# Patient Record
Sex: MALE | Race: BLACK OR AFRICAN AMERICAN | Employment: UNEMPLOYED | URBAN - METROPOLITAN AREA
[De-identification: names, ages, dates, MRNs, and addresses within clinical notes are randomized per-mention and may not be internally consistent; named-entity substitution may affect disease eponyms.]

---

## 2017-01-03 DIAGNOSIS — G40.209 PARTIAL SYMPTOMATIC EPILEPSY WITH COMPLEX PARTIAL SEIZURES, NOT INTRACTABLE, WITHOUT STATUS EPILEPTICUS (HCC): Primary | ICD-10-CM

## 2017-01-04 NOTE — TELEPHONE ENCOUNTER
Attempted to reach patient. No answer. Tried to reach patient's mother but not available at this time. Will try again later.      Noted patient has Keppra 750 mg and 500 mg and per Dr Patrice Olvera note on 6/15/16 patient to take keppra 1000 mg in am and 1500 mg

## 2017-01-04 NOTE — TELEPHONE ENCOUNTER
Medication: Keppra 500 mg    Date of last refill: 11/30/16  [place]    Last office visit: 6/15/16  Due back to clinic per last office note: 6 months   Date next office visit scheduled: 1/18/17    Last OV note recommen

## 2017-01-06 RX ORDER — LEVETIRACETAM 500 MG/1
TABLET ORAL
Qty: 90 TABLET | Refills: 0 | Status: SHIPPED | OUTPATIENT
Start: 2017-01-06 | End: 2017-02-16

## 2017-01-06 NOTE — TELEPHONE ENCOUNTER
See refill request from 01/03/17. Please refuse this request once request from 01/03/17 is approved.

## 2017-01-06 NOTE — TELEPHONE ENCOUNTER
Patient called back.  He states he is on Keppra 500 mg am and 1000 mg pm.     Medication pending Dr Karen Sanchez approval.

## 2017-01-09 ENCOUNTER — HOSPITAL ENCOUNTER (OUTPATIENT)
Age: 27
Discharge: HOME | End: 2017-01-09
Attending: FAMILY MEDICINE
Payer: COMMERCIAL

## 2017-01-09 VITALS
HEIGHT: 71 IN | TEMPERATURE: 99 F | DIASTOLIC BLOOD PRESSURE: 77 MMHG | WEIGHT: 180 LBS | OXYGEN SATURATION: 100 % | SYSTOLIC BLOOD PRESSURE: 130 MMHG | BODY MASS INDEX: 25.2 KG/M2 | RESPIRATION RATE: 18 BRPM | HEART RATE: 60 BPM

## 2017-01-09 DIAGNOSIS — L72.3 INFECTED SEBACEOUS CYST: Primary | ICD-10-CM

## 2017-01-09 DIAGNOSIS — K13.0 CHAPPED LIPS: ICD-10-CM

## 2017-01-09 DIAGNOSIS — L08.9 INFECTED SEBACEOUS CYST: Primary | ICD-10-CM

## 2017-01-09 PROCEDURE — 99213 OFFICE O/P EST LOW 20 MIN: CPT

## 2017-01-09 PROCEDURE — 99204 OFFICE O/P NEW MOD 45 MIN: CPT

## 2017-01-09 RX ORDER — LEVETIRACETAM 500 MG/1
500 TABLET ORAL 2 TIMES DAILY
COMMUNITY
End: 2017-01-12

## 2017-01-09 RX ORDER — SULFAMETHOXAZOLE AND TRIMETHOPRIM 800; 160 MG/1; MG/1
1 TABLET ORAL 2 TIMES DAILY
Qty: 20 TABLET | Refills: 0 | Status: SHIPPED | OUTPATIENT
Start: 2017-01-09 | End: 2017-02-24

## 2017-01-09 NOTE — ED PROVIDER NOTES
Patient Seen in: [facility] In [facility]    History   Patient presents with:  Lump Mass (integumentary)    Stated Complaint: LUMP ON BACK, PAINFUL/SWOLLEN LIPS    HPI    This 64-year-old male presents to the office with complaint of lump oscar 98.6 °F (37 °C)   Temp src 01/09/17 1053 Temporal   SpO2 01/09/17 1053 100 %   O2 Device 01/09/17 1053 None (Room air)       Current:/77 mmHg  Pulse 60  Temp(Src) 98.6 °F (37 °C) (Temporal)  Resp 18  Ht 180.3 cm (5' 11\")  Wt 81.647 kg  BMI 25.12 kg/ diagnosis)  Chapped lips    Disposition:  Discharge    Follow-up:  Aashish De La Vega,   2009 [address]    Schedule an appointment as soon as possible for a visit in 3 days  If symptoms worsen      Medicati

## 2017-01-09 NOTE — ED INITIAL ASSESSMENT (HPI)
Patient presents to [facility]. Care with cc of lump left upper back x 9 days. States it had been larger before and now has come down in size approx 6cmx 5cm. 2 days ago noted blisters to right lower lip swelling with canker sores. States now right upper back begin

## 2017-01-10 RX ORDER — LEVETIRACETAM 500 MG/1
TABLET ORAL
Qty: 90 TABLET | Refills: 0 | OUTPATIENT
Start: 2017-01-10

## 2017-01-10 NOTE — TELEPHONE ENCOUNTER
Rx for John signed by Dr. Kenneth Rivera per [facility] 1/6/17. Refused duplicate request with note to pharmacy at this time.

## 2017-01-12 ENCOUNTER — HOSPITAL ENCOUNTER (OUTPATIENT)
Age: 27
Discharge: HOME | End: 2017-01-12
Attending: FAMILY MEDICINE
Payer: COMMERCIAL

## 2017-01-12 VITALS
DIASTOLIC BLOOD PRESSURE: 75 MMHG | RESPIRATION RATE: 16 BRPM | HEART RATE: 73 BPM | OXYGEN SATURATION: 100 % | SYSTOLIC BLOOD PRESSURE: 129 MMHG | TEMPERATURE: 98 F

## 2017-01-12 DIAGNOSIS — L02.212 ABSCESS OF BACK: ICD-10-CM

## 2017-01-12 DIAGNOSIS — B00.1 HERPES LABIALIS: Primary | ICD-10-CM

## 2017-01-12 PROCEDURE — 99213 OFFICE O/P EST LOW 20 MIN: CPT

## 2017-01-12 PROCEDURE — 99214 OFFICE O/P EST MOD 30 MIN: CPT

## 2017-01-12 NOTE — ED PROVIDER NOTES
Patient Seen in: [facility] In [facility] END    History   Patient presents with:  Mouth/Lip Problem    Stated Complaint: recheck from 1/9/17; needs release for work due to reaction    HPI    Patient is a 70-year-old male.   Coming in today for fo discharge, ear pain, facial swelling, hearing loss, mouth sores, nosebleeds, postnasal drip, rhinorrhea, sinus pressure, sneezing, sore throat, tinnitus, trouble swallowing and voice change. Lip lesions   Eyes: Negative. Respiratory: Negative. heard.  Pulmonary/Chest: Effort normal and breath sounds normal. No respiratory distress. He has no wheezes. He has no rales. Lymphadenopathy:     He has no cervical adenopathy. Neurological: He is alert and oriented to person, place, and time.    Skin:

## 2017-01-12 NOTE — ED INITIAL ASSESSMENT (HPI)
Here for follow-up of the lip, yesterday started swolling and blistered, burning sensation and a little itchy. Started on Bactrim Ds on 1/10/2016 for infected sebaceous cyst.  Need a note if ok to go back to work.  (is a )

## 2017-02-16 RX ORDER — LEVETIRACETAM 500 MG/1
TABLET ORAL
Qty: 45 TABLET | Refills: 0 | Status: SHIPPED | OUTPATIENT
Start: 2017-02-16 | End: 2018-09-05

## 2017-02-16 NOTE — TELEPHONE ENCOUNTER
Left message for patient to give us a call back. Please help patient make an appointment. Then we can refill medication to the appointment date.       Medication: Levetiracetam 500mg    Date of last refill: 1/6/17  [place]

## 2017-02-24 ENCOUNTER — HOSPITAL ENCOUNTER (OUTPATIENT)
Age: 27
Discharge: HOME | End: 2017-02-24
Payer: COMMERCIAL

## 2017-02-24 VITALS
OXYGEN SATURATION: 96 % | DIASTOLIC BLOOD PRESSURE: 78 MMHG | HEART RATE: 83 BPM | TEMPERATURE: 98 F | SYSTOLIC BLOOD PRESSURE: 117 MMHG | RESPIRATION RATE: 20 BRPM

## 2017-02-24 DIAGNOSIS — J11.1 INFLUENZA: Primary | ICD-10-CM

## 2017-02-24 LAB — POCT RAPID STREP: NEGATIVE

## 2017-02-24 PROCEDURE — 99214 OFFICE O/P EST MOD 30 MIN: CPT

## 2017-02-24 PROCEDURE — 87147 CULTURE TYPE IMMUNOLOGIC: CPT | Performed by: FAMILY MEDICINE

## 2017-02-24 PROCEDURE — 87430 STREP A AG IA: CPT | Performed by: FAMILY MEDICINE

## 2017-02-24 PROCEDURE — 87081 CULTURE SCREEN ONLY: CPT | Performed by: FAMILY MEDICINE

## 2017-02-24 RX ORDER — IBUPROFEN 600 MG/1
600 TABLET ORAL ONCE
Status: COMPLETED | OUTPATIENT
Start: 2017-02-24 | End: 2017-02-24

## 2017-02-24 RX ORDER — ONDANSETRON 4 MG/1
4 TABLET, ORALLY DISINTEGRATING ORAL EVERY 4 HOURS PRN
Qty: 10 TABLET | Refills: 0 | Status: SHIPPED | OUTPATIENT
Start: 2017-02-24 | End: 2017-03-03

## 2017-02-24 NOTE — ED PROVIDER NOTES
Patient Seen in: [facility] In [facility] [address]    History   Patient presents with:  Fever    Stated Complaint: fever x 3 days / sorethroat / cough Jose SANCHEZ    Jacque Nielson is a 64-year-old male who presents for evaluation of sore throat, body aches noted above. PSFH elements reviewed from today and agreed except as otherwise stated in HPI.     Physical Exam     ED Triage Vitals   BP 02/24/17 1045 130/74 mmHg   Pulse 02/24/17 1045 76   Resp 02/24/17 1045 20   Temp 02/24/17 1045 100 °F (37.8 °C)   Te patient is encouraged to return if any concerning symptoms arise. Additional verbal discharge instructions are given to the patient and discussed. Discharge medications are discussed.  The patient is in good condition throughout his visit today and remains

## 2017-02-26 NOTE — ED NOTES
Pt called and given positive strep results, and need to  antibiotic of amoxil 875 mg po twice daily for 10 days, he will change out sheets and toothbrush after 24 hour  Called to [facility],  QS , may substitute no refills

## 2017-11-30 ENCOUNTER — HOSPITAL (OUTPATIENT)
Dept: OTHER | Age: 27
End: 2017-11-30
Attending: INTERNAL MEDICINE

## 2017-11-30 LAB
ALBUMIN SERPL-MCNC: 5 GM/DL (ref 3.6–5.1)
ALP SERPL-CCNC: 71 UNIT/L (ref 45–117)
ALT SERPL-CCNC: 55 UNIT/L
AMPHETAMINES UR QL SCN>500 NG/ML: NEGATIVE
ANALYZER ANC (IANC): ABNORMAL
ANION GAP SERPL CALC-SCNC: 14 MMOL/L (ref 10–20)
ANION GAP SERPL CALC-SCNC: 43 MMOL/L (ref 10–20)
APAP SERPL-MCNC: <2 MCG/ML (ref 10–30)
APTT PPP: 35 SECONDS (ref 22–30)
APTT PPP: ABNORMAL S
AST SERPL-CCNC: 36 UNIT/L
BARBITURATES UR QL SCN>200 NG/ML: NEGATIVE
BASE DEFICIT BLDA-SCNC: 18 MMOL/L (ref 0–2)
BASE DEFICIT BLDA-SCNC: 4 MMOL/L (ref 0–2)
BASE DEFICIT BLDV-SCNC: 29 MMOL/L (ref 0–2)
BASE EXCESS BLDA CALC-SCNC: ABNORMAL MMOL/L
BASE EXCESS BLDA CALC-SCNC: ABNORMAL MMOL/L
BASE EXCESS BLDV CALC-SCNC: ABNORMAL MMOL/L
BENZODIAZ UR QL SCN>200 NG/ML: POSITIVE
BILIRUB CONJ SERPL-MCNC: 0.1 MG/DL (ref 0–0.2)
BILIRUB SERPL-MCNC: 0.5 MG/DL (ref 0.2–1)
BODY TEMPERATURE: 37 DEGREES
BODY TEMPERATURE: 37 DEGREES
BUN SERPL-MCNC: 19 MG/DL (ref 6–20)
BUN SERPL-MCNC: 24 MG/DL (ref 6–20)
BUN/CREAT SERPL: 12 (ref 7–25)
BUN/CREAT SERPL: 8 (ref 7–25)
BZE UR QL SCN>150 NG/ML: NEGATIVE
CA-I BLD ISE-SCNC: 1.22 MMOL/L (ref 1.15–1.29)
CALCIUM SERPL-MCNC: 11 MG/DL (ref 8.4–10.2)
CALCIUM SERPL-MCNC: 8.3 MG/DL (ref 8.4–10.2)
CANNABINOIDS UR QL SCN>50 NG/ML: POSITIVE
CHLORIDE: 102 MMOL/L (ref 98–107)
CHLORIDE: 109 MMOL/L (ref 98–107)
CK SERPL-CCNC: 560 UNIT/L (ref 39–308)
CK SERPL-CCNC: 822 UNIT/L (ref 39–308)
CO2 SERPL-SCNC: 23 MMOL/L (ref 21–32)
CO2 SERPL-SCNC: 9 MMOL/L (ref 21–32)
CREAT SERPL-MCNC: 1.63 MG/DL (ref 0.67–1.17)
CREAT SERPL-MCNC: 2.88 MG/DL (ref 0.67–1.17)
CREATININE, POC: 1.5 MG/DL (ref 0.67–1.17)
ERYTHROCYTE [DISTWIDTH] IN BLOOD: 14.5 % (ref 11–15)
ETHANOL SERPL-MCNC: NORMAL MG/DL
GAS FLOW.O2 O2 DELIVERY SYS: ABNORMAL L/MIN
GAS FLOW.O2 O2 DELIVERY SYS: ABNORMAL L/MIN
GGT SERPL-CCNC: 18 UNIT/L (ref 15–85)
GLUCOSE BLDC GLUCOMTR-MCNC: 124 MG/DL (ref 65–99)
GLUCOSE BLDC GLUCOMTR-MCNC: 243 MG/DL (ref 65–99)
GLUCOSE BLDC GLUCOMTR-MCNC: 69 MG/DL (ref 65–99)
GLUCOSE BLDC GLUCOMTR-MCNC: 91 MG/DL (ref 65–99)
GLUCOSE SERPL-MCNC: 194 MG/DL (ref 65–99)
GLUCOSE SERPL-MCNC: 92 MG/DL (ref 65–99)
HCO3 BLDA-SCNC: 15 MMOL/L (ref 22–28)
HCO3 BLDA-SCNC: 21 MMOL/L (ref 22–28)
HCO3 BLDV-SCNC: 7 MMOL/L (ref 22–28)
HCT VFR BLD CALC: 54 % (ref 39–51)
HEMATOCRIT: 52.1 % (ref 39–51)
HGB BLD-MCNC: 16.2 GM/DL (ref 13–17)
INHALED O2 CONCENTRATION: ABNORMAL %
INR PPP: 1.1
LACTATE BLDV-SCNC: 1.2 MMOL/L (ref 0–2)
LACTATE BLDV-SCNC: 2.4 MMOL/L (ref 0–2)
LACTATE BLDV-SCNC: 3.9 MMOL/L (ref 0–2)
LACTATE BLDV-SCNC: 9.4 MMOL/L (ref 0–2)
LEVETIRACETAM SERPL-MCNC: <2 MCG/ML (ref 6–46)
MAGNESIUM SERPL-MCNC: 3.1 MG/DL (ref 1.7–2.4)
MAGNESIUM SERPL-MCNC: 3.5 MG/DL (ref 1.7–2.4)
MCH RBC QN AUTO: 29.9 PG (ref 26–34)
MCHC RBC AUTO-ENTMCNC: 31.1 GM/DL (ref 32–36.5)
MCV RBC AUTO: 96.3 FL (ref 78–100)
METHADONE UR QL SCN>300 NG/ML: NEGATIVE NG/ML
OPIATES UR QL SCN>300 NG/ML: NEGATIVE
OSMOLALITY SERPL: 304 MOSM/KG (ref 275–300)
OXYHGB MFR BLDA: 96 % (ref 94–98)
OXYHGB MFR BLDA: 97 % (ref 94–98)
PCO2 BLDA: 43 MM HG (ref 35–48)
PCO2 BLDA: 61 MM HG (ref 35–48)
PCO2 BLDV: 59 MM HG (ref 41–54)
PCP UR QL SCN>25 NG/ML: NEGATIVE
PH BLDA: 7.01 UNIT (ref 7.35–7.45)
PH BLDA: 7.32 UNIT (ref 7.35–7.45)
PH BLDV: 6.69 UNIT (ref 7.35–7.45)
PHOSPHATE SERPL-MCNC: 3.8 MG/DL (ref 2.4–4.7)
PLATELET # BLD: 270 THOUSAND/MCL (ref 140–450)
PO2 BLDA: 224 MM HG (ref 83–108)
PO2 BLDA: 251 MM HG (ref 83–108)
PO2 BLDV: 78 MM HG (ref 35–42)
POTASSIUM BLD-SCNC: 3.7 MMOL/L (ref 3.4–5.1)
POTASSIUM SERPL-SCNC: 3.4 MMOL/L (ref 3.4–5.1)
POTASSIUM SERPL-SCNC: 3.7 MMOL/L (ref 3.4–5.1)
POTASSIUM SERPL-SCNC: 3.8 MMOL/L (ref 3.4–5.1)
PROT SERPL-MCNC: 8.8 GM/DL (ref 6.4–8.2)
PROTHROMBIN TIME: 11 SECONDS (ref 9.7–11.8)
PROTHROMBIN TIME: NORMAL
RBC # BLD: 5.41 MILLION/MCL (ref 4.5–5.9)
SALICYLATES SERPL-MCNC: 4 MG/DL
SAO2 % BLDA: 98 % (ref 95–99)
SAO2 % BLDA: 99 % (ref 95–99)
SAO2 % BLDV: 71 % (ref 60–80)
SODIUM BLD-SCNC: 145 MMOL/L (ref 135–145)
SODIUM SERPL-SCNC: 143 MMOL/L (ref 135–145)
SODIUM SERPL-SCNC: 150 MMOL/L (ref 135–145)
TROPONIN I SERPL HS-MCNC: <0.02 NG/ML
WBC # BLD: 9.8 THOUSAND/MCL (ref 4.2–11)

## 2017-12-01 LAB
AMORPH SED URNS QL MICRO: ABNORMAL
ANALYZER ANC (IANC): ABNORMAL
ANION GAP SERPL CALC-SCNC: 12 MMOL/L (ref 10–20)
ANION GAP SERPL CALC-SCNC: 15 MMOL/L (ref 10–20)
ANION GAP SERPL CALC-SCNC: 17 MMOL/L (ref 10–20)
ANNOTATION COMMENT IMP: NORMAL
APPEARANCE UR: ABNORMAL
APTT PPP: 30 SECONDS (ref 22–30)
APTT PPP: NORMAL S
BACTERIA #/AREA URNS HPF: ABNORMAL /HPF
BASOPHILS # BLD: 0 THOUSAND/MCL (ref 0–0.3)
BASOPHILS NFR BLD: 0 %
BILIRUB UR QL: NEGATIVE
BUN SERPL-MCNC: 19 MG/DL (ref 6–20)
BUN SERPL-MCNC: 28 MG/DL (ref 6–20)
BUN SERPL-MCNC: 30 MG/DL (ref 6–20)
BUN/CREAT SERPL: 5 (ref 7–25)
BUN/CREAT SERPL: 6 (ref 7–25)
BUN/CREAT SERPL: 7 (ref 7–25)
CALCIUM SERPL-MCNC: 7.8 MG/DL (ref 8.4–10.2)
CALCIUM SERPL-MCNC: 8.2 MG/DL (ref 8.4–10.2)
CALCIUM SERPL-MCNC: 8.4 MG/DL (ref 8.4–10.2)
CAOX CRY URNS QL MICRO: PRESENT
CHLORIDE: 107 MMOL/L (ref 98–107)
CHLORIDE: 109 MMOL/L (ref 98–107)
CHLORIDE: 111 MMOL/L (ref 98–107)
CK SERPL-CCNC: 8716 UNIT/L (ref 39–308)
CK SERPL-CCNC: ABNORMAL UNIT/L (ref 39–308)
CO2 SERPL-SCNC: 20 MMOL/L (ref 21–32)
CO2 SERPL-SCNC: 21 MMOL/L (ref 21–32)
CO2 SERPL-SCNC: 24 MMOL/L (ref 21–32)
COLOR UR: YELLOW
CREAT SERPL-MCNC: 3.94 MG/DL (ref 0.67–1.17)
CREAT SERPL-MCNC: 4.19 MG/DL (ref 0.67–1.17)
CREAT SERPL-MCNC: 4.72 MG/DL (ref 0.67–1.17)
DIFFERENTIAL METHOD BLD: ABNORMAL
EOSINOPHIL # BLD: 0 THOUSAND/MCL (ref 0.1–0.5)
EOSINOPHIL NFR BLD: 0 %
EPITH CASTS #/AREA URNS LPF: ABNORMAL /[LPF]
ERYTHROCYTE [DISTWIDTH] IN BLOOD: 14.5 % (ref 11–15)
FATTY CASTS #/AREA URNS LPF: ABNORMAL /[LPF]
GLUCOSE BLDC GLUCOMTR-MCNC: 99 MG/DL (ref 65–99)
GLUCOSE SERPL-MCNC: 78 MG/DL (ref 65–99)
GLUCOSE SERPL-MCNC: 82 MG/DL (ref 65–99)
GLUCOSE SERPL-MCNC: 98 MG/DL (ref 65–99)
GLUCOSE UR-MCNC: ABNORMAL MG/DL
GRAN CASTS #/AREA URNS LPF: ABNORMAL /[LPF]
HBV CORE IGG+IGM SER QL: NEGATIVE
HBV SURFACE AB SER QL: NEGATIVE
HBV SURFACE AG SER QL: NEGATIVE
HEMATOCRIT: 38.6 % (ref 39–51)
HGB BLD-MCNC: 12.9 GM/DL (ref 13–17)
HGB UR QL: ABNORMAL
HYALINE CASTS #/AREA URNS LPF: ABNORMAL /LPF (ref 0–5)
INR PPP: 1.1
KETONES UR-MCNC: NEGATIVE MG/DL
LEUKOCYTE ESTERASE UR QL STRIP: ABNORMAL
LYMPHOCYTES # BLD: 1 THOUSAND/MCL (ref 1–4.8)
LYMPHOCYTES NFR BLD: 8 %
MCH RBC QN AUTO: 29.5 PG (ref 26–34)
MCHC RBC AUTO-ENTMCNC: 33.4 GM/DL (ref 32–36.5)
MCV RBC AUTO: 88.1 FL (ref 78–100)
MIXED CELL CASTS #/AREA URNS LPF: ABNORMAL /[LPF]
MONOCYTES # BLD: 1.2 THOUSAND/MCL (ref 0.3–0.9)
MONOCYTES NFR BLD: 9 %
MUCOUS THREADS URNS QL MICRO: PRESENT
NEUTROPHILS # BLD: 10.7 THOUSAND/MCL (ref 1.8–7.7)
NEUTROPHILS NFR BLD: 83 %
NEUTS SEG NFR BLD: ABNORMAL %
NITRITE UR QL: NEGATIVE
ORGANIC ACIDS/CREAT UR-SRTO: 207.78 MG/DL
PERCENT NRBC: ABNORMAL
PH UR: 5 UNIT (ref 5–7)
PLATELET # BLD: 183 THOUSAND/MCL (ref 140–450)
POTASSIUM SERPL-SCNC: 3.3 MMOL/L (ref 3.4–5.1)
POTASSIUM SERPL-SCNC: 4.1 MMOL/L (ref 3.4–5.1)
POTASSIUM SERPL-SCNC: 4.4 MMOL/L (ref 3.4–5.1)
PROT UR QL: 100 MG/DL
PROTHROMBIN TIME: 10.8 SECONDS (ref 9.7–11.8)
PROTHROMBIN TIME: NORMAL
RBC # BLD: 4.38 MILLION/MCL (ref 4.5–5.9)
RBC #/AREA URNS HPF: ABNORMAL /HPF (ref 0–3)
RBC CASTS #/AREA URNS LPF: ABNORMAL /[LPF]
RENAL EPI CELLS #/AREA URNS HPF: ABNORMAL /[HPF]
SODIUM SERPL-SCNC: 140 MMOL/L (ref 135–145)
SODIUM SERPL-SCNC: 142 MMOL/L (ref 135–145)
SODIUM SERPL-SCNC: 143 MMOL/L (ref 135–145)
SODIUM UR-SCNC: 72 MMOL/L
SP GR UR: 1.01 (ref 1–1.03)
SPECIMEN SOURCE: ABNORMAL
SPERM URNS QL MICRO: ABNORMAL
SQUAMOUS #/AREA URNS HPF: ABNORMAL /HPF (ref 0–5)
T VAGINALIS URNS QL MICRO: ABNORMAL
TRI-PHOS CRY URNS QL MICRO: ABNORMAL
URATE CRY URNS QL MICRO: ABNORMAL
URINE REFLEX: ABNORMAL
URNS CMNT MICRO: ABNORMAL
UROBILINOGEN UR QL: 0.2 MG/DL (ref 0–1)
WAXY CASTS #/AREA URNS LPF: ABNORMAL /[LPF]
WBC # BLD: 12.9 THOUSAND/MCL (ref 4.2–11)
WBC #/AREA URNS HPF: ABNORMAL /HPF (ref 0–5)
WBC CASTS #/AREA URNS LPF: ABNORMAL /[LPF]
YEAST HYPHAE URNS QL MICRO: ABNORMAL
YEAST URNS QL MICRO: ABNORMAL

## 2017-12-02 LAB
ANALYZER ANC (IANC): ABNORMAL
ANION GAP SERPL CALC-SCNC: 11 MMOL/L (ref 10–20)
BASOPHILS # BLD: 0 THOUSAND/MCL (ref 0–0.3)
BASOPHILS NFR BLD: 0 %
BUN SERPL-MCNC: 21 MG/DL (ref 6–20)
BUN/CREAT SERPL: 4 (ref 7–25)
CALCIUM SERPL-MCNC: 8.1 MG/DL (ref 8.4–10.2)
CHLORIDE: 106 MMOL/L (ref 98–107)
CHOLEST SERPL-MCNC: 117 MG/DL
CHOLEST/HDLC SERPL: 3.5 {RATIO}
CK SERPL-CCNC: ABNORMAL UNIT/L (ref 39–308)
CO2 SERPL-SCNC: 23 MMOL/L (ref 21–32)
CREAT SERPL-MCNC: 5.06 MG/DL (ref 0.67–1.17)
DIFFERENTIAL METHOD BLD: ABNORMAL
EOSINOPHIL # BLD: 0 THOUSAND/MCL (ref 0.1–0.5)
EOSINOPHIL NFR BLD: 0 %
ERYTHROCYTE [DISTWIDTH] IN BLOOD: 14 % (ref 11–15)
GLUCOSE SERPL-MCNC: 98 MG/DL (ref 65–99)
HDLC SERPL-MCNC: 33 MG/DL
HEMATOCRIT: 36.4 % (ref 39–51)
HGB BLD-MCNC: 12.3 GM/DL (ref 13–17)
LDLC SERPL CALC-MCNC: 64 MG/DL
LIPASE SERPL-CCNC: 83 UNIT/L (ref 73–393)
LYMPHOCYTES # BLD: 1.2 THOUSAND/MCL (ref 1–4.8)
LYMPHOCYTES NFR BLD: 13 %
MAGNESIUM SERPL-MCNC: 1.8 MG/DL (ref 1.7–2.4)
MCH RBC QN AUTO: 29 PG (ref 26–34)
MCHC RBC AUTO-ENTMCNC: 33.8 GM/DL (ref 32–36.5)
MCV RBC AUTO: 85.8 FL (ref 78–100)
MONOCYTES # BLD: 1.1 THOUSAND/MCL (ref 0.3–0.9)
MONOCYTES NFR BLD: 12 %
NEUTROPHILS # BLD: 7 THOUSAND/MCL (ref 1.8–7.7)
NEUTROPHILS NFR BLD: 75 %
NEUTS SEG NFR BLD: ABNORMAL %
NONHDLC SERPL-MCNC: 84 MG/DL
PERCENT NRBC: ABNORMAL
PHOSPHATE SERPL-MCNC: 2.7 MG/DL (ref 2.4–4.7)
PLATELET # BLD: 165 THOUSAND/MCL (ref 140–450)
POTASSIUM SERPL-SCNC: 3.5 MMOL/L (ref 3.4–5.1)
RBC # BLD: 4.24 MILLION/MCL (ref 4.5–5.9)
SODIUM SERPL-SCNC: 136 MMOL/L (ref 135–145)
TRIGLYCERIDE (TRIGP): 101 MG/DL
WBC # BLD: 9.4 THOUSAND/MCL (ref 4.2–11)

## 2017-12-03 LAB
ANALYZER ANC (IANC): ABNORMAL
ANION GAP SERPL CALC-SCNC: 15 MMOL/L (ref 10–20)
BASOPHILS # BLD: 0 THOUSAND/MCL (ref 0–0.3)
BASOPHILS NFR BLD: 0 %
BUN SERPL-MCNC: 27 MG/DL (ref 6–20)
BUN/CREAT SERPL: 3 (ref 7–25)
CALCIUM SERPL-MCNC: 8.5 MG/DL (ref 8.4–10.2)
CHLORIDE: 110 MMOL/L (ref 98–107)
CK SERPL-CCNC: ABNORMAL UNIT/L (ref 39–308)
CO2 SERPL-SCNC: 21 MMOL/L (ref 21–32)
CREAT SERPL-MCNC: 7.93 MG/DL (ref 0.67–1.17)
DIFFERENTIAL METHOD BLD: ABNORMAL
EOSINOPHIL # BLD: 0.1 THOUSAND/MCL (ref 0.1–0.5)
EOSINOPHIL NFR BLD: 1 %
ERYTHROCYTE [DISTWIDTH] IN BLOOD: 13.9 % (ref 11–15)
GLUCOSE SERPL-MCNC: 97 MG/DL (ref 65–99)
HEMATOCRIT: 35.8 % (ref 39–51)
HGB BLD-MCNC: 12.6 GM/DL (ref 13–17)
LYMPHOCYTES # BLD: 1.3 THOUSAND/MCL (ref 1–4.8)
LYMPHOCYTES NFR BLD: 15 %
MAGNESIUM SERPL-MCNC: 1.8 MG/DL (ref 1.7–2.4)
MCH RBC QN AUTO: 30.2 PG (ref 26–34)
MCHC RBC AUTO-ENTMCNC: 35.2 GM/DL (ref 32–36.5)
MCV RBC AUTO: 85.9 FL (ref 78–100)
MONOCYTES # BLD: 1.2 THOUSAND/MCL (ref 0.3–0.9)
MONOCYTES NFR BLD: 13 %
NEUTROPHILS # BLD: 6.5 THOUSAND/MCL (ref 1.8–7.7)
NEUTROPHILS NFR BLD: 71 %
NEUTS SEG NFR BLD: ABNORMAL %
PERCENT NRBC: ABNORMAL
PHOSPHATE SERPL-MCNC: 3.1 MG/DL (ref 2.4–4.7)
PLATELET # BLD: 168 THOUSAND/MCL (ref 140–450)
POTASSIUM SERPL-SCNC: 4.4 MMOL/L (ref 3.4–5.1)
RBC # BLD: 4.17 MILLION/MCL (ref 4.5–5.9)
SODIUM SERPL-SCNC: 142 MMOL/L (ref 135–145)
WBC # BLD: 9.1 THOUSAND/MCL (ref 4.2–11)

## 2017-12-04 LAB
ALBUMIN SERPL-MCNC: 3.2 GM/DL (ref 3.6–5.1)
ALBUMIN/GLOB SERPL: 1.1 {RATIO} (ref 1–2.4)
ALP SERPL-CCNC: 60 UNIT/L (ref 45–117)
ALT SERPL-CCNC: 104 UNIT/L
ANALYZER ANC (IANC): ABNORMAL
ANION GAP SERPL CALC-SCNC: 14 MMOL/L (ref 10–20)
AST SERPL-CCNC: 168 UNIT/L
BILIRUB SERPL-MCNC: 0.7 MG/DL (ref 0.2–1)
BUN SERPL-MCNC: 29 MG/DL (ref 6–20)
BUN/CREAT SERPL: 4 (ref 7–25)
CALCIUM SERPL-MCNC: 8.8 MG/DL (ref 8.4–10.2)
CHLORIDE: 109 MMOL/L (ref 98–107)
CO2 SERPL-SCNC: 26 MMOL/L (ref 21–32)
CREAT SERPL-MCNC: 7.81 MG/DL (ref 0.67–1.17)
ERYTHROCYTE [DISTWIDTH] IN BLOOD: 13.9 % (ref 11–15)
GLOBULIN SER-MCNC: 3 GM/DL (ref 2–4)
GLUCOSE SERPL-MCNC: 94 MG/DL (ref 65–99)
HEMATOCRIT: 35.4 % (ref 39–51)
HGB BLD-MCNC: 12.7 GM/DL (ref 13–17)
MCH RBC QN AUTO: 30.9 PG (ref 26–34)
MCHC RBC AUTO-ENTMCNC: 35.9 GM/DL (ref 32–36.5)
MCV RBC AUTO: 86.1 FL (ref 78–100)
PLATELET # BLD: 158 THOUSAND/MCL (ref 140–450)
POTASSIUM SERPL-SCNC: 4.3 MMOL/L (ref 3.4–5.1)
PREALB SERPL NEPH-MCNC: 20.3 MG/DL (ref 18–38)
PROT SERPL-MCNC: 6.2 GM/DL (ref 6.4–8.2)
RBC # BLD: 4.11 MILLION/MCL (ref 4.5–5.9)
SODIUM SERPL-SCNC: 145 MMOL/L (ref 135–145)
WBC # BLD: 6.8 THOUSAND/MCL (ref 4.2–11)

## 2017-12-05 LAB
ALBUMIN SERPL-MCNC: 3.5 GM/DL (ref 3.6–5.1)
ALBUMIN/GLOB SERPL: 1.1 {RATIO} (ref 1–2.4)
ALP SERPL-CCNC: 65 UNIT/L (ref 45–117)
ALT SERPL-CCNC: 109 UNIT/L
ANION GAP SERPL CALC-SCNC: 13 MMOL/L (ref 10–20)
AST SERPL-CCNC: 142 UNIT/L
BILIRUB SERPL-MCNC: 0.6 MG/DL (ref 0.2–1)
BUN SERPL-MCNC: 27 MG/DL (ref 6–20)
BUN/CREAT SERPL: 5 (ref 7–25)
CALCIUM SERPL-MCNC: 8.8 MG/DL (ref 8.4–10.2)
CHLORIDE: 109 MMOL/L (ref 98–107)
CO2 SERPL-SCNC: 29 MMOL/L (ref 21–32)
CREAT SERPL-MCNC: 5.49 MG/DL (ref 0.67–1.17)
GLOBULIN SER-MCNC: 3.1 GM/DL (ref 2–4)
GLUCOSE SERPL-MCNC: 85 MG/DL (ref 65–99)
POTASSIUM SERPL-SCNC: 4.5 MMOL/L (ref 3.4–5.1)
PROT SERPL-MCNC: 6.6 GM/DL (ref 6.4–8.2)
SODIUM SERPL-SCNC: 146 MMOL/L (ref 135–145)

## 2017-12-06 LAB
ALBUMIN SERPL-MCNC: 3.6 GM/DL (ref 3.6–5.1)
ALBUMIN/GLOB SERPL: 1 {RATIO} (ref 1–2.4)
ALP SERPL-CCNC: 66 UNIT/L (ref 45–117)
ALT SERPL-CCNC: 109 UNIT/L
ANION GAP SERPL CALC-SCNC: 12 MMOL/L (ref 10–20)
AST SERPL-CCNC: 115 UNIT/L
BILIRUB SERPL-MCNC: 0.3 MG/DL (ref 0.2–1)
BUN SERPL-MCNC: 26 MG/DL (ref 6–20)
BUN/CREAT SERPL: 7 (ref 7–25)
CALCIUM SERPL-MCNC: 8.6 MG/DL (ref 8.4–10.2)
CHLORIDE: 104 MMOL/L (ref 98–107)
CO2 SERPL-SCNC: 30 MMOL/L (ref 21–32)
CREAT SERPL-MCNC: 3.83 MG/DL (ref 0.67–1.17)
GLOBULIN SER-MCNC: 3.6 GM/DL (ref 2–4)
GLUCOSE SERPL-MCNC: 89 MG/DL (ref 65–99)
HEMATOCRIT: 39.3 % (ref 39–51)
HGB BLD-MCNC: 13.6 GM/DL (ref 13–17)
MAGNESIUM SERPL-MCNC: 1.3 MG/DL (ref 1.7–2.4)
PHOSPHATE SERPL-MCNC: 5.5 MG/DL (ref 2.4–4.7)
POTASSIUM SERPL-SCNC: 4.4 MMOL/L (ref 3.4–5.1)
PROT SERPL-MCNC: 7.2 GM/DL (ref 6.4–8.2)
SODIUM SERPL-SCNC: 142 MMOL/L (ref 135–145)

## 2018-09-05 ENCOUNTER — LABORATORY ENCOUNTER (OUTPATIENT)
Dept: LAB | Age: 28
End: 2018-09-05
Attending: Other
Payer: MEDICAID

## 2018-09-05 ENCOUNTER — OFFICE VISIT (OUTPATIENT)
Dept: NEUROLOGY | Facility: CLINIC | Age: 28
End: 2018-09-05
Payer: MEDICAID

## 2018-09-05 VITALS
WEIGHT: 186 LBS | SYSTOLIC BLOOD PRESSURE: 125 MMHG | RESPIRATION RATE: 16 BRPM | BODY MASS INDEX: 26.04 KG/M2 | HEIGHT: 71 IN | DIASTOLIC BLOOD PRESSURE: 73 MMHG | HEART RATE: 63 BPM

## 2018-09-05 DIAGNOSIS — G40.209 PARTIAL SYMPTOMATIC EPILEPSY WITH COMPLEX PARTIAL SEIZURES, NOT INTRACTABLE, WITHOUT STATUS EPILEPTICUS (HCC): ICD-10-CM

## 2018-09-05 DIAGNOSIS — G40.209 PARTIAL SYMPTOMATIC EPILEPSY WITH COMPLEX PARTIAL SEIZURES, NOT INTRACTABLE, WITHOUT STATUS EPILEPTICUS (HCC): Primary | ICD-10-CM

## 2018-09-05 LAB
ALBUMIN SERPL-MCNC: 4.4 G/DL (ref 3.5–4.8)
ALBUMIN/GLOB SERPL: 1.4 {RATIO} (ref 1–2)
ALP LIVER SERPL-CCNC: 84 U/L (ref 45–117)
ALT SERPL-CCNC: 29 U/L (ref 17–63)
ANION GAP SERPL CALC-SCNC: 7 MMOL/L (ref 0–18)
AST SERPL-CCNC: 16 U/L (ref 15–41)
BASOPHILS # BLD AUTO: 0.02 X10(3) UL (ref 0–0.1)
BASOPHILS NFR BLD AUTO: 0.4 %
BILIRUB SERPL-MCNC: 0.3 MG/DL (ref 0.1–2)
BUN BLD-MCNC: 15 MG/DL (ref 8–20)
BUN/CREAT SERPL: 11.7 (ref 10–20)
CALCIUM BLD-MCNC: 9.1 MG/DL (ref 8.3–10.3)
CHLORIDE SERPL-SCNC: 108 MMOL/L (ref 101–111)
CO2 SERPL-SCNC: 28 MMOL/L (ref 22–32)
CREAT BLD-MCNC: 1.28 MG/DL (ref 0.7–1.3)
EOSINOPHIL # BLD AUTO: 0.18 X10(3) UL (ref 0–0.3)
EOSINOPHIL NFR BLD AUTO: 3.9 %
ERYTHROCYTE [DISTWIDTH] IN BLOOD BY AUTOMATED COUNT: 14.6 % (ref 11.5–16)
GLOBULIN PLAS-MCNC: 3.2 G/DL (ref 2.5–4)
GLUCOSE BLD-MCNC: 114 MG/DL (ref 70–99)
HCT VFR BLD AUTO: 39.1 % (ref 37–53)
HGB BLD-MCNC: 13.3 G/DL (ref 13–17)
IMMATURE GRANULOCYTE COUNT: 0.01 X10(3) UL (ref 0–1)
IMMATURE GRANULOCYTE RATIO %: 0.2 %
LYMPHOCYTES # BLD AUTO: 1.27 X10(3) UL (ref 0.9–4)
LYMPHOCYTES NFR BLD AUTO: 27.4 %
M PROTEIN MFR SERPL ELPH: 7.6 G/DL (ref 6.1–8.3)
MCH RBC QN AUTO: 30 PG (ref 27–33.2)
MCHC RBC AUTO-ENTMCNC: 34 G/DL (ref 31–37)
MCV RBC AUTO: 88.3 FL (ref 80–99)
MONOCYTES # BLD AUTO: 0.44 X10(3) UL (ref 0.1–1)
MONOCYTES NFR BLD AUTO: 9.5 %
NEUTROPHIL ABS PRELIM: 2.71 X10 (3) UL (ref 1.3–6.7)
NEUTROPHILS # BLD AUTO: 2.71 X10(3) UL (ref 1.3–6.7)
NEUTROPHILS NFR BLD AUTO: 58.6 %
OSMOLALITY SERPL CALC.SUM OF ELEC: 298 MOSM/KG (ref 275–295)
PHENYTOIN (DILANTIN): 15.9 UG/ML (ref 10–20)
PLATELET # BLD AUTO: 233 10(3)UL (ref 150–450)
POTASSIUM SERPL-SCNC: 3.4 MMOL/L (ref 3.6–5.1)
RBC # BLD AUTO: 4.43 X10(6)UL (ref 4.3–5.7)
RED CELL DISTRIBUTION WIDTH-SD: 46.8 FL (ref 35.1–46.3)
SODIUM SERPL-SCNC: 143 MMOL/L (ref 136–144)
WBC # BLD AUTO: 4.6 X10(3) UL (ref 4–13)

## 2018-09-05 PROCEDURE — 80053 COMPREHEN METABOLIC PANEL: CPT

## 2018-09-05 PROCEDURE — 85025 COMPLETE CBC W/AUTO DIFF WBC: CPT

## 2018-09-05 PROCEDURE — 99214 OFFICE O/P EST MOD 30 MIN: CPT | Performed by: OTHER

## 2018-09-05 PROCEDURE — 80185 ASSAY OF PHENYTOIN TOTAL: CPT

## 2018-09-05 PROCEDURE — 1111F DSCHRG MED/CURRENT MED MERGE: CPT | Performed by: OTHER

## 2018-09-05 PROCEDURE — 36415 COLL VENOUS BLD VENIPUNCTURE: CPT

## 2018-09-05 RX ORDER — PHENYTOIN SODIUM 100 MG/1
200 CAPSULE, EXTENDED RELEASE ORAL DAILY
COMMUNITY
End: 2018-10-16

## 2018-09-05 RX ORDER — PHENYTOIN 50 MG/1
150 TABLET, CHEWABLE ORAL NIGHTLY
COMMUNITY
End: 2018-10-16

## 2018-09-05 NOTE — PATIENT INSTRUCTIONS
Please have levels checked   Have EEG done    Refill policies:    • Allow 2-3 business days for refills; controlled substances may take longer.   • Contact your pharmacy at least 5 days prior to running out of medication and have them send an electronic req authorization, patient may be responsible for the entire amount billed. Precertification and Prior Authorizations: If your physician has recommended that you have a procedure or additional testing performed.   [facility]) will gauze. Paste is water soluble so most of it will be cleaned out of your hair with a warm washcloth before you leave. If you like, you can bring a hat to wear after the test or put your hair in a ponytail.    · You will be asked to relax with your eyes arleth

## 2018-09-05 NOTE — PROGRESS NOTES
[facility] Progress Note    Patient presents with:  Hospital F/U: Patient had two seizures on Saturday morning      HPI  As per my initial H&P from 9/9/14:  Eufemia Godoy is a 20year old, who presents for evaluation of seizures.  Pa admits to two episodes in the past year where he was wet his bed; admits to some issues with memory.  \"  l   Patient last seen 10/2015:        Since last visit, he has been lost to follow up - [place] and and was not seeing a neurologist at t inguinal hernia  No date: SKIN GRAFT PROCEDURE  Family History   Problem Relation Age of Onset   • Seizure Disorder Cousin      Social History    Socioeconomic History      Marital status: Single      Spouse name: Not on file      Number of children: Not o Patient Position: Sitting, Cuff Size: large)   Pulse 63   Resp 16   Ht 71\"   Wt 186 lb   BMI 25.94 kg/m²   Estimated body mass index is 25.94 kg/m² as calculated from the following:    Height as of this encounter: 71\".     Weight as of this encounter: 186 levels checked - adjust medications pending results  - will check EEG and CBC, CMP as well and review prior notes when able     No seizures currently and will continue same dose while awaiting levels     (G40.209) Partial symptomatic epilepsy with complex

## 2018-09-19 ENCOUNTER — TELEPHONE (OUTPATIENT)
Dept: NEUROLOGY | Facility: CLINIC | Age: 28
End: 2018-09-19

## 2018-09-19 NOTE — TELEPHONE ENCOUNTER
Spoke with pt regarding pt needing an appt to follow up with test results.    Advised pt that test results are in from labs but he should make the appt a couple days after 10/5/18. (pt has EEG on 10/5)  Pt has no other questions or concerns and verbalized u

## 2018-10-05 ENCOUNTER — NURSE ONLY (OUTPATIENT)
Dept: NEUROLOGY | Facility: CLINIC | Age: 28
End: 2018-10-05
Payer: MEDICAID

## 2018-10-05 DIAGNOSIS — G40.209 PARTIAL SYMPTOMATIC EPILEPSY WITH COMPLEX PARTIAL SEIZURES, NOT INTRACTABLE, WITHOUT STATUS EPILEPTICUS (HCC): Primary | ICD-10-CM

## 2018-10-05 PROCEDURE — 95819 EEG AWAKE AND ASLEEP: CPT | Performed by: OTHER

## 2018-10-15 NOTE — PROCEDURES
[facility] in [place]  in affiliation with [facility]  [address]  [place], [address] [place] [address]  [phone]  Fax [phone]      Name: Rick Bustamante  10/14/1990  Date

## 2018-10-16 ENCOUNTER — OFFICE VISIT (OUTPATIENT)
Dept: NEUROLOGY | Facility: CLINIC | Age: 28
End: 2018-10-16
Payer: MEDICAID

## 2018-10-16 VITALS
SYSTOLIC BLOOD PRESSURE: 133 MMHG | WEIGHT: 186 LBS | HEART RATE: 71 BPM | RESPIRATION RATE: 16 BRPM | BODY MASS INDEX: 26.04 KG/M2 | HEIGHT: 71 IN | DIASTOLIC BLOOD PRESSURE: 77 MMHG

## 2018-10-16 DIAGNOSIS — G40.209 PARTIAL SYMPTOMATIC EPILEPSY WITH COMPLEX PARTIAL SEIZURES, NOT INTRACTABLE, WITHOUT STATUS EPILEPTICUS (HCC): Primary | ICD-10-CM

## 2018-10-16 DIAGNOSIS — G25.81 RESTLESS LEGS SYNDROME (RLS): ICD-10-CM

## 2018-10-16 PROCEDURE — 99214 OFFICE O/P EST MOD 30 MIN: CPT | Performed by: OTHER

## 2018-10-16 RX ORDER — PHENYTOIN 50 MG/1
150 TABLET, CHEWABLE ORAL NIGHTLY
Qty: 90 TABLET | Refills: 5 | Status: SHIPPED | OUTPATIENT
Start: 2018-10-16 | End: 2018-10-25

## 2018-10-16 RX ORDER — GABAPENTIN 100 MG/1
100 CAPSULE ORAL NIGHTLY
Qty: 30 CAPSULE | Refills: 2 | Status: SHIPPED | OUTPATIENT
Start: 2018-10-16 | End: 2018-10-25

## 2018-10-16 RX ORDER — PHENYTOIN SODIUM 100 MG/1
200 CAPSULE, EXTENDED RELEASE ORAL DAILY
Qty: 60 CAPSULE | Refills: 5 | Status: SHIPPED | OUTPATIENT
Start: 2018-10-16 | End: 2018-11-01 | Stop reason: CLARIF

## 2018-10-16 NOTE — PROGRESS NOTES
[facility] Progress Note    Patient presents with:  Seizures: Follow up      HPI  As per my initial H&P from 9/9/14:  \"Rocco Guerra is a 20year old, who presents for evaluation of seizures.  Patient states he had his first seizure a year where he was wet his bed; admits to some issues with memory. \"         Overall, since last visit, he has been doing better. Patient states a couple of weeks ago, he had one episode of dizziness and speaking gibberish.        About 2 weeks ago, he file    Other Topics      Concerns:         Service: Not Asked        Blood Transfusions: Not Asked        Caffeine Concern: Yes          soda occasionally        Occupational Exposure: Not Asked        Hobby Hazards: Not Asked        Sleep Concern intact, tongue and palate midline, SCM/hearing intact, otherwise, CN 2-12 intact   Motor: 5/5 strength throughout, tone normal  Sensory: intact to light touch throughout  Coord: FNF and HKS intact with no tremor or dysmetria  Romberg: absent  Gait: normal 6.1 - 8.3 g/dL 7.6   Albumin      3.5 - 4.8 g/dL 4.4   GLOBULIN, TOTAL      2.5 - 4.0 g/dL 3.2   A/G RATIO      1.0 - 2.0 1.4   PHENYTOIN (DILANTIN)      10.0 - 20.0 ug/mL 15.9       Imaging:  None    Other procedures:   New since last visit:     EEG: (10/ rule out epilepsy as a diagnosis, and we will continue the same medications, at this time. Will monitor for any new or worsening symptoms.     Additionally, patient was counseled regarding the need to take his seizure medications at the prescribed dosage

## 2018-10-16 NOTE — PATIENT INSTRUCTIONS
Refill policies:    • Allow 2-3 business days for refills; controlled substances may take longer.   • Contact your pharmacy at least 5 days prior to running out of medication and have them send an electronic request or submit request through the “request re entire amount billed. Precertification and Prior Authorizations: If your physician has recommended that you have a procedure or additional testing performed.   [facility]) will contact your insurance carrier to obtain pre-certi

## 2018-10-19 ENCOUNTER — TELEPHONE (OUTPATIENT)
Dept: NEUROLOGY | Facility: CLINIC | Age: 28
End: 2018-10-19

## 2018-10-19 DIAGNOSIS — G40.209 PARTIAL SYMPTOMATIC EPILEPSY WITH COMPLEX PARTIAL SEIZURES, NOT INTRACTABLE, WITHOUT STATUS EPILEPTICUS (HCC): Primary | ICD-10-CM

## 2018-10-19 NOTE — TELEPHONE ENCOUNTER
Called patient and kimberly - states he had seizure earlier this AM - woke up feeling nauseated and dizzy, then had generalized tonic clonic seizure ~ 2 minutes this AM ~0830 - was post-ictal, with urinary incontinence per kimberly, went to ER and currently

## 2018-10-19 NOTE — TELEPHONE ENCOUNTER
Patient with 2 new seizures since initial one; no neurologist at local hospital per ER and recommended admission - states he will be transferred to [facility] where neurology is - requested update from admission when done

## 2018-10-19 NOTE — TELEPHONE ENCOUNTER
Patient had a seizure this morning at about 9:00 am and is at the ER [facility] in [facility].  Patient was seeing on Tuesday and per Dr Valderrama Frijarvis if patient had a seizure to inform .Please call deep back at [phone]

## 2018-10-19 NOTE — TELEPHONE ENCOUNTER
Pt is still in the ER, about to be released, when he experienced another seizure. Had just taken additional medication recommended by Dr. Nayely Davidson. Mom requesting to speak with Dr. Nayely Davidson.

## 2018-10-22 NOTE — TELEPHONE ENCOUNTER
Dilantin level is within range  ~17 per chart review from this past weekend - however, still having seizures; will increase Vimpat to 150 mg bid    And have follow up as scheduled this week.

## 2018-10-22 NOTE — TELEPHONE ENCOUNTER
Patient contacted office this AM for condition update. Had another seizure over the weekend after discharge. Made appointment for Thursday.

## 2018-10-25 ENCOUNTER — OFFICE VISIT (OUTPATIENT)
Dept: NEUROLOGY | Facility: CLINIC | Age: 28
End: 2018-10-25
Payer: MEDICAID

## 2018-10-25 ENCOUNTER — APPOINTMENT (OUTPATIENT)
Dept: LAB | Age: 28
End: 2018-10-25
Attending: Other
Payer: MEDICAID

## 2018-10-25 VITALS — RESPIRATION RATE: 18 BRPM | SYSTOLIC BLOOD PRESSURE: 126 MMHG | DIASTOLIC BLOOD PRESSURE: 72 MMHG | HEART RATE: 68 BPM

## 2018-10-25 DIAGNOSIS — G40.209 PARTIAL SYMPTOMATIC EPILEPSY WITH COMPLEX PARTIAL SEIZURES, NOT INTRACTABLE, WITHOUT STATUS EPILEPTICUS (HCC): ICD-10-CM

## 2018-10-25 DIAGNOSIS — G25.81 RESTLESS LEGS SYNDROME (RLS): ICD-10-CM

## 2018-10-25 PROCEDURE — 99214 OFFICE O/P EST MOD 30 MIN: CPT | Performed by: OTHER

## 2018-10-25 PROCEDURE — 36415 COLL VENOUS BLD VENIPUNCTURE: CPT

## 2018-10-25 PROCEDURE — 80185 ASSAY OF PHENYTOIN TOTAL: CPT

## 2018-10-25 RX ORDER — GABAPENTIN 100 MG/1
200 CAPSULE ORAL NIGHTLY
Qty: 60 CAPSULE | Refills: 2 | Status: SHIPPED | OUTPATIENT
Start: 2018-10-25 | End: 2018-12-07 | Stop reason: ALTCHOICE

## 2018-10-25 NOTE — PATIENT INSTRUCTIONS
Set up appointment with epilepsy specialist at [facility] same dose of medications for now   Follow up after seen   Refill policies:    • Allow 2-3 business days for refills; controlled substances may take longer.   • Contact your pharmacy at least 5 d directly to determine coverage. If test is done without insurance authorization, patient may be responsible for the entire amount billed. Precertification and Prior Authorizations:   If your physician has recommended that you have a procedure or addit

## 2018-10-25 NOTE — PROGRESS NOTES
[facility] Progress Note    Patient presents with:  Seizures      HPI  As per my initial H&P from 9/9/14:  Sarah Dougherty is a 20year old, who presents for evaluation of seizures. Patient states he had his first seizure at age 23.  H was wet his bed; admits to some issues with memory. \"         Overall, since last visit, he has been doing better. Patient states a couple of weeks ago, he had one episode of dizziness and speaking gibberish.        About 2 weeks ago, he was also havi Past Medical History:   Diagnosis Date   • Epilepsy ([facility] [address]    • Headache     Due to seizures   • Seizure disorder [facility])      Past Surgical History:   Procedure Laterality Date   • HERNIA SURGERY      Umbilical   • REPAIR ING HERNIA,5+Y/O,REDUCIBL      ri MG Oral Cap, Take 2 capsules (200 mg total) by mouth daily.  (Patient taking differently: Take 200 mg by mouth 2 (two) times daily.  ), Disp: 60 capsule, Rfl: 5  •  gabapentin 100 MG Oral Cap, Take 1 capsule (100 mg total) by mouth nightly., Disp: 30 capsul uL 2.71   Lymphocytes Absolute      0.90 - 4.00 x10(3) uL 1.27   Monocytes Absolute      0.10 - 1.00 x10(3) uL 0.44   Eosinophils Absolute      0.00 - 0.30 x10(3) uL 0.18   Basophils Absolute      0.00 - 0.10 x10(3) uL 0.02   Immature Granulocyte Absolute were noted, characterized by the   appearance of vertex waves and sleep spindles, respectively    Photic stimulation and hyperventilation were performed and   produced no significant changes     Epileptiform activity: None    Seizures: none    Events: none opinion for admission to Epilepsy monitoring until for optimization of medication regimen as well as event capture.       In addition, I discussed the option of adding a new antiseizure medication, with the understanding there is ~ 5% likelihood of remissio

## 2018-10-30 ENCOUNTER — TELEPHONE (OUTPATIENT)
Dept: NEUROLOGY | Facility: CLINIC | Age: 28
End: 2018-10-30

## 2018-10-30 NOTE — TELEPHONE ENCOUNTER
Spoke with pt regarding message below. Pt had no other questions for doctor.     ----- Message from Sade Edwards MD sent at 10/26/2018 12:25 PM CDT -----  Results noted, phenytoin level within range now (18.1) - let patient know - no changes to [facility]

## 2018-11-01 ENCOUNTER — HOSPITAL ENCOUNTER (OUTPATIENT)
Facility: HOSPITAL | Age: 28
Setting detail: OBSERVATION
LOS: 1 days | Discharge: HOME | End: 2018-11-03
Attending: EMERGENCY MEDICINE | Admitting: HOSPITALIST
Payer: MEDICAID

## 2018-11-01 ENCOUNTER — TELEPHONE (OUTPATIENT)
Dept: NEUROLOGY | Facility: CLINIC | Age: 28
End: 2018-11-01

## 2018-11-01 DIAGNOSIS — G40.909 RECURRENT SEIZURES (HCC): Primary | ICD-10-CM

## 2018-11-01 LAB
ALBUMIN SERPL-MCNC: 4.2 G/DL (ref 3.1–4.5)
ALBUMIN/GLOB SERPL: 1.2 {RATIO} (ref 1–2)
ALP LIVER SERPL-CCNC: 91 U/L (ref 45–117)
ALT SERPL-CCNC: 52 U/L (ref 17–63)
ANION GAP SERPL CALC-SCNC: 5 MMOL/L (ref 0–18)
AST SERPL-CCNC: 32 U/L (ref 15–41)
BASOPHILS # BLD AUTO: 0.02 X10(3) UL (ref 0–0.1)
BASOPHILS NFR BLD AUTO: 0.6 %
BILIRUB SERPL-MCNC: 0.2 MG/DL (ref 0.1–2)
BUN BLD-MCNC: 15 MG/DL (ref 8–20)
BUN/CREAT SERPL: 12.4 (ref 10–20)
CALCIUM BLD-MCNC: 9.4 MG/DL (ref 8.3–10.3)
CHLORIDE SERPL-SCNC: 106 MMOL/L (ref 101–111)
CO2 SERPL-SCNC: 26 MMOL/L (ref 22–32)
CREAT BLD-MCNC: 1.21 MG/DL (ref 0.7–1.3)
EOSINOPHIL # BLD AUTO: 0.04 X10(3) UL (ref 0–0.3)
EOSINOPHIL NFR BLD AUTO: 1.1 %
ERYTHROCYTE [DISTWIDTH] IN BLOOD BY AUTOMATED COUNT: 14.6 % (ref 11.5–16)
GLOBULIN PLAS-MCNC: 3.5 G/DL (ref 2.8–4.4)
GLUCOSE BLD-MCNC: 78 MG/DL (ref 70–99)
HCT VFR BLD AUTO: 43.9 % (ref 37–53)
HGB BLD-MCNC: 14.2 G/DL (ref 13–17)
IMMATURE GRANULOCYTE COUNT: 0.01 X10(3) UL (ref 0–1)
IMMATURE GRANULOCYTE RATIO %: 0.3 %
LYMPHOCYTES # BLD AUTO: 0.7 X10(3) UL (ref 0.9–4)
LYMPHOCYTES NFR BLD AUTO: 19.4 %
M PROTEIN MFR SERPL ELPH: 7.7 G/DL (ref 6.4–8.2)
MCH RBC QN AUTO: 29.3 PG (ref 27–33.2)
MCHC RBC AUTO-ENTMCNC: 32.3 G/DL (ref 31–37)
MCV RBC AUTO: 90.5 FL (ref 80–99)
MONOCYTES # BLD AUTO: 0.31 X10(3) UL (ref 0.1–1)
MONOCYTES NFR BLD AUTO: 8.6 %
NEUTROPHIL ABS PRELIM: 2.53 X10 (3) UL (ref 1.3–6.7)
NEUTROPHILS # BLD AUTO: 2.53 X10(3) UL (ref 1.3–6.7)
NEUTROPHILS NFR BLD AUTO: 70 %
OSMOLALITY SERPL CALC.SUM OF ELEC: 284 MOSM/KG (ref 275–295)
PHENYTOIN (DILANTIN): 12.3 UG/ML (ref 10–20)
PLATELET # BLD AUTO: 231 10(3)UL (ref 150–450)
POTASSIUM SERPL-SCNC: 4.4 MMOL/L (ref 3.6–5.1)
RBC # BLD AUTO: 4.85 X10(6)UL (ref 4.3–5.7)
RED CELL DISTRIBUTION WIDTH-SD: 48.9 FL (ref 35.1–46.3)
SODIUM SERPL-SCNC: 137 MMOL/L (ref 136–144)
WBC # BLD AUTO: 3.6 X10(3) UL (ref 4–13)

## 2018-11-01 PROCEDURE — 99254 IP/OBS CNSLTJ NEW/EST MOD 60: CPT | Performed by: OTHER

## 2018-11-01 PROCEDURE — 99220 INITIAL OBSERVATION CARE,LEVL III: CPT | Performed by: HOSPITALIST

## 2018-11-01 RX ORDER — METOCLOPRAMIDE HYDROCHLORIDE 5 MG/ML
10 INJECTION INTRAMUSCULAR; INTRAVENOUS EVERY 8 HOURS PRN
Status: DISCONTINUED | OUTPATIENT
Start: 2018-11-01 | End: 2018-11-03

## 2018-11-01 RX ORDER — LORAZEPAM 2 MG/ML
0.5 INJECTION INTRAMUSCULAR ONCE
Status: COMPLETED | OUTPATIENT
Start: 2018-11-01 | End: 2018-11-01

## 2018-11-01 RX ORDER — LORAZEPAM 2 MG/ML
1 INJECTION INTRAMUSCULAR EVERY 6 HOURS PRN
Status: DISCONTINUED | OUTPATIENT
Start: 2018-11-01 | End: 2018-11-03

## 2018-11-01 RX ORDER — LEVETIRACETAM 100 MG/ML
500 SOLUTION ORAL 2 TIMES DAILY
Status: DISCONTINUED | OUTPATIENT
Start: 2018-11-01 | End: 2018-11-02

## 2018-11-01 RX ORDER — GABAPENTIN 100 MG/1
200 CAPSULE ORAL NIGHTLY
Status: DISCONTINUED | OUTPATIENT
Start: 2018-11-01 | End: 2018-11-03

## 2018-11-01 RX ORDER — LORAZEPAM 2 MG/ML
INJECTION INTRAMUSCULAR
Status: COMPLETED
Start: 2018-11-01 | End: 2018-11-01

## 2018-11-01 RX ORDER — PHENYTOIN SODIUM 100 MG/1
200 CAPSULE, EXTENDED RELEASE ORAL 2 TIMES DAILY
Status: COMPLETED | OUTPATIENT
Start: 2018-11-01 | End: 2018-11-01

## 2018-11-01 RX ORDER — HYDROCODONE BITARTRATE AND ACETAMINOPHEN 5; 325 MG/1; MG/1
2 TABLET ORAL EVERY 4 HOURS PRN
Status: DISCONTINUED | OUTPATIENT
Start: 2018-11-01 | End: 2018-11-03

## 2018-11-01 RX ORDER — LORAZEPAM 2 MG/ML
0.5 INJECTION INTRAMUSCULAR EVERY 6 HOURS PRN
Status: DISCONTINUED | OUTPATIENT
Start: 2018-11-01 | End: 2018-11-03

## 2018-11-01 RX ORDER — ONDANSETRON 2 MG/ML
4 INJECTION INTRAMUSCULAR; INTRAVENOUS EVERY 4 HOURS PRN
Status: DISCONTINUED | OUTPATIENT
Start: 2018-11-01 | End: 2018-11-01

## 2018-11-01 RX ORDER — SODIUM CHLORIDE 9 MG/ML
INJECTION, SOLUTION INTRAVENOUS CONTINUOUS
Status: DISCONTINUED | OUTPATIENT
Start: 2018-11-01 | End: 2018-11-03

## 2018-11-01 RX ORDER — HYDROCODONE BITARTRATE AND ACETAMINOPHEN 5; 325 MG/1; MG/1
1 TABLET ORAL EVERY 4 HOURS PRN
Status: DISCONTINUED | OUTPATIENT
Start: 2018-11-01 | End: 2018-11-03

## 2018-11-01 RX ORDER — PHENYTOIN SODIUM 100 MG/1
200 CAPSULE, EXTENDED RELEASE ORAL 2 TIMES DAILY
Status: ON HOLD | COMMUNITY
End: 2018-11-02

## 2018-11-01 RX ORDER — ACETAMINOPHEN 325 MG/1
650 TABLET ORAL EVERY 4 HOURS PRN
Status: DISCONTINUED | OUTPATIENT
Start: 2018-11-01 | End: 2018-11-03

## 2018-11-01 RX ORDER — ACETAMINOPHEN 500 MG
1000 TABLET ORAL ONCE
Status: COMPLETED | OUTPATIENT
Start: 2018-11-01 | End: 2018-11-01

## 2018-11-01 RX ORDER — SODIUM CHLORIDE 9 MG/ML
INJECTION, SOLUTION INTRAVENOUS ONCE
Status: COMPLETED | OUTPATIENT
Start: 2018-11-01 | End: 2018-11-02

## 2018-11-01 RX ORDER — ONDANSETRON 2 MG/ML
4 INJECTION INTRAMUSCULAR; INTRAVENOUS EVERY 6 HOURS PRN
Status: DISCONTINUED | OUTPATIENT
Start: 2018-11-01 | End: 2018-11-03

## 2018-11-01 NOTE — H&P
[facility] HOSPITALIST  History and Physical     Ivon Carreon Patient Status:  Inpatient    10/14/1990 MRN [id number]   [facility] 7NE-A Attending Balta Henry MD   Hosp Day # 0 PCP No primary care provider on file.      Chief Compl Rfl:    gabapentin 100 MG Oral Cap Take 2 capsules (200 mg total) by mouth nightly. Disp: 60 capsule Rfl: 2   Lacosamide 150 MG Oral Tab Take 150 mg by mouth 2 (two) times daily.  Disp: 60 tablet Rfl: 0       Review of Systems:   A comprehensive 14 point re ER  2. Dilantin level therapeutic but on low side  3. Pt is complaint with meds  4. EEG pending  5. Neurology to eval  2.  Migraine, likely related to above, per neurology    Quality:  · DVT Prophylaxis: SCDs  · CODE status: Full  · Marie: no    Plan of car

## 2018-11-01 NOTE — ED PROVIDER NOTES
Patient Seen in: [facility] Emergency Department    History   Patient presents with:  Seizure Disorder (neurologic)    Stated Complaint: Has had 2 seizures this morning between 745-9am. taking seizure meds    HPI    Patient is a pleasant 24-year-old stated complaint: Has had 2 seizures this morning between 745-9am. taking seizure meds  Other systems are as noted in HPI. Constitutional and vital signs reviewed. All other systems reviewed and negative except as noted above.     Physical Exam     ED WITH PLATELET.   Procedure                               Abnormality         Status                     ---------                               -----------         ------                     CBC W/ DIFFERENTIAL[[id number]]          Abnormal            Final

## 2018-11-01 NOTE — ED NOTES
Pt awake and alert, appears comfortable. Pt c/o headache and requesting tylenol. MD made aware and orders received.

## 2018-11-01 NOTE — ED NOTES
Attempt made to call report at this time. Charge RN on unit requesting to have nurse call back for report in approximately 10 minutes.

## 2018-11-01 NOTE — ED NOTES
Pt appears comfortable and in no distress. Report given to Latasha Wylie RN at this time and transport contacted.

## 2018-11-01 NOTE — ED INITIAL ASSESSMENT (HPI)
Pt here for two seizures this am, lasting 30mins or less per family members. Pt has hx of seizure disorder. Pt states slight headache and dizziness now. Pt denies falling or hitting head. Pt denies loss of bowel or bladder. Pt had recent cold.  Pt denies f

## 2018-11-01 NOTE — ED NOTES
Pt states he  Has to urinate. Pt instructed that he should remain on cart to void due to recent seizure.

## 2018-11-01 NOTE — ED NOTES
Pt awake and alert, pt made comfortable on cart. Family at bedside. Pt aware side rails will remain up x 2, pt is not to ambulate without staff due to concern for seizure.

## 2018-11-01 NOTE — ED NOTES
IV accidentally d/c'd by pt with movements post seizure. Side rails padded x 2 at this time, pt now appears more calm, able to direct pt to lay on cart.

## 2018-11-01 NOTE — ED NOTES
Staff remains at bedside with pt and pt observed to have grand mal seizure.    Ativan 0.5 mg IVP given per MD order

## 2018-11-01 NOTE — PLAN OF CARE
NURSING ADMISSION NOTE      Patient admitted via Cart  Oriented to room. Safety precautions initiated. Bed in low position. Call light in reach. Received pt from ER around 1500. A&Ox4, VSS on RA, NSR on tele  No neuro deficits noted  C/o HA.  Pt s

## 2018-11-01 NOTE — ED NOTES
Called to room by family with pt appearing as though he is having a seizure. Dr. Arcadio Hernandez made aware and called to room. Pt seated upright, side rails up x 2, pt seated upright, eyes open but not verbal, pt noted to have mouth movements.  Verbal order receive

## 2018-11-01 NOTE — ED NOTES
Pt appears drowsy but awake, oriented x 3, skin w/d,resps reg/unlabored pt appears restless on cart. RN remains at bedside.

## 2018-11-01 NOTE — ED NOTES
Seizure activity has subsided, pt appears post ictal, confused, attempting to get off cart. Pt restrained by staff for safety at this time, concern pt may fall. Dr. Js Tejeda remains at beside.

## 2018-11-01 NOTE — CONSULTS
[id number] [place] [address] with [facility]  11/1/2018    3:54 PM      Cc:   Seizures    HPI:   26 yo right male, who began having seizures at age 23 and apparently did relatively well with Keppra until he was in his mi °C) (Oral)   Resp 18   Ht 71\"   Wt 188 lb   SpO2 99%   BMI 26.22 kg/m²    Appears stated age  HENT:  Pink conjunctiva, anicteric sclerae, moist mucosa  Neck: No adenopathy or thyromegaly  Heart S1S2, no murmur  Lungs are clear, no wheezing  Abd: soft  Ext

## 2018-11-01 NOTE — TELEPHONE ENCOUNTER
S: Seizure this morning    B: LOV 10/25 with notes:  Given patient's refractory seizures to multiple different medications, possibilites include drug resistant epilepsy versus concurrent non-epileptic spells - I have advised him to consider second opinion

## 2018-11-02 LAB
ANION GAP SERPL CALC-SCNC: 6 MMOL/L (ref 0–18)
BUN BLD-MCNC: 14 MG/DL (ref 8–20)
BUN/CREAT SERPL: 11.3 (ref 10–20)
CALCIUM BLD-MCNC: 8.5 MG/DL (ref 8.3–10.3)
CHLORIDE SERPL-SCNC: 109 MMOL/L (ref 101–111)
CO2 SERPL-SCNC: 26 MMOL/L (ref 22–32)
CREAT BLD-MCNC: 1.24 MG/DL (ref 0.7–1.3)
GLUCOSE BLD-MCNC: 72 MG/DL (ref 70–99)
OSMOLALITY SERPL CALC.SUM OF ELEC: 291 MOSM/KG (ref 275–295)
POTASSIUM SERPL-SCNC: 3.7 MMOL/L (ref 3.6–5.1)
SODIUM SERPL-SCNC: 141 MMOL/L (ref 136–144)

## 2018-11-02 PROCEDURE — 99233 SBSQ HOSP IP/OBS HIGH 50: CPT | Performed by: OTHER

## 2018-11-02 PROCEDURE — 99225 SUBSEQUENT OBSERVATION CARE: CPT | Performed by: HOSPITALIST

## 2018-11-02 RX ORDER — LEVETIRACETAM 100 MG/ML
750 SOLUTION ORAL 2 TIMES DAILY
Status: DISCONTINUED | OUTPATIENT
Start: 2018-11-02 | End: 2018-11-03

## 2018-11-02 RX ORDER — LEVETIRACETAM 500 MG/1
500 TABLET ORAL ONCE
Status: COMPLETED | OUTPATIENT
Start: 2018-11-02 | End: 2018-11-02

## 2018-11-02 RX ORDER — ONDANSETRON 4 MG/1
4 TABLET, FILM COATED ORAL EVERY 6 HOURS PRN
Qty: 25 TABLET | Refills: 0 | Status: SHIPPED | OUTPATIENT
Start: 2018-11-02 | End: 2018-11-20

## 2018-11-02 RX ORDER — LEVETIRACETAM 750 MG/1
750 TABLET ORAL 2 TIMES DAILY
Qty: 60 TABLET | Refills: 1 | Status: SHIPPED | OUTPATIENT
Start: 2018-11-02 | End: 2018-11-20 | Stop reason: DRUGHIGH

## 2018-11-02 NOTE — PLAN OF CARE
Assumed care at [facility]. Pt A&Ox4. VSS on RA. NSR-SB per tele. Neuro checks intact, no seizure activity noted. Continuous EEG running. Resting in bed comfortably. Will continue to monitor.      NEUROLOGICAL - ADULT    • Achieves stable or improved ne

## 2018-11-02 NOTE — PAYOR COMM NOTE
--------------  ADMISSION REVIEW     Payor: Alden Ojeda #:  [id number]  Authorization Number: [id number]    Admit date: 11/1/18  Admit time: 1432       Admitting Physician: Angy Bethea MD  Attending Physician: ING HERNIA,5+Y/O,REDUCIBL      right inguinal hernia   • SKIN GRAFT PROCEDURE         Review of Systems    Positive for stated complaint: Has had 2 seizures this morning between 745-9am. taking seizure meds  Other systems are as noted in HPI.   Constitution laboratory for further evaluation. An infusion of normal saline was initiated. Patient was observed while the laboratory studies were obtained. Patient was noted to develop lipsmacking activity with decreased responsiveness.   This was followed shortly b time, he is complaining of severe migraine headache. He states that this usually occurs with his seizures. He also had another  seizure in the ER and received IV Ativan as well as was loaded with Dilantin.       Family History:   Family History   Problem MCV  90.5   PLT  231.0       Recent Labs   Lab  11/01/18   1101   GLU  78   BUN  15   CREATSERUM  1.21   GFRAA  92   GFRNAA  80   CA  9.4   ALB  4.2   NA  137   K  4.4   CL  106   CO2  26.0   ALKPHO  91   AST  32   ALT  52   BILT  0.2   TP  7.7       Est Note    Subjective:  Anthony Cisneros is a(n) 25year old male who we are following for breakthrough seizures. He currently has nausea, had breakfast already. He also had L-sided HA this morning, similar to the types of HA he gets before a seizure. IVPB     Date Action Dose Route User    11/1/2018 1255 New Bag 500 mg Intravenous Darlin JIM RN      Phenytoin Sodium Extended (DILANTIN) ER cap 200 mg     Date Action Dose Route User    11/1/2018 2041 Given 200 mg Oral Maurizio Chaves RN      0.

## 2018-11-02 NOTE — PLAN OF CARE
Patient awake, alert and oriented x4  Telemetry, Sinus Rhythm  C/o intermittent \"mild\" dull left-sided headache; pain intensity varies 0-4/10  Declines pharmacological intervention for intermittent headache  C/o intermittent nausea; denies vomiting; radha

## 2018-11-02 NOTE — PROGRESS NOTES
[facility]PITAL[facility]  Progress Note     Blade Zechariah Carreon Patient Status:  Inpatient    10/14/1990 MRN [id number]   [facility] 7NE-A Attending Marlene Finn MD   Hosp Day # 1 PCP No primary care provider on file.      Chief Complaint: Suhas Stark eval  2.  Migraine, likely related to above, per neurology     Plan of care: AED to be adjusted per neurology, d/c planning    Quality:  · DVT Prophylaxis: SCDs  · CODE status: Full  · Marie: no  · Central line: no    Estimated date of discharge: 11/2  Disc

## 2018-11-03 VITALS
TEMPERATURE: 98 F | HEART RATE: 69 BPM | WEIGHT: 188 LBS | RESPIRATION RATE: 17 BRPM | HEIGHT: 71 IN | SYSTOLIC BLOOD PRESSURE: 121 MMHG | DIASTOLIC BLOOD PRESSURE: 61 MMHG | BODY MASS INDEX: 26.32 KG/M2 | OXYGEN SATURATION: 100 %

## 2018-11-03 PROCEDURE — 95951 EEG MONITORING/VIDEORECORD: CPT | Performed by: OTHER

## 2018-11-03 PROCEDURE — 99232 SBSQ HOSP IP/OBS MODERATE 35: CPT | Performed by: OTHER

## 2018-11-03 PROCEDURE — 99217 OBSERVATION CARE DISCHARGE: CPT | Performed by: HOSPITALIST

## 2018-11-03 NOTE — PLAN OF CARE
NURSING DISCHARGE NOTE    Discharged Home via Ambulatory. Accompanied by Friend and Support staff  Belongings Taken by patient/family. AVS discharge paperwork completed and reviewed with patient.  Reinforced importance of making and attending follow

## 2018-11-03 NOTE — PLAN OF CARE
Assumed care at [facility]. AOx4. Significant other at bedside. Denies any headache or discomfort. No seizure activities or twitching noted. Seizure precautions maintained. Dr. Donnell Dowling to see today. Plan of care discussed with pt. Call light within reach.

## 2018-11-03 NOTE — PROGRESS NOTES
IM quick note:    D/c held yesterday as pt did not feel well, c/o nausea, arm twitching. Improved overnight, no further complaints, now eager to go home.     /61 (BP Location: Left arm)   Pulse 69   Temp 98.1 °F (36.7 °C) (Oral)   Resp 17   Ht 5' 1

## 2018-11-04 NOTE — PROGRESS NOTES
[facility]    Progress Note    Tena Carreon Patient Status:  Observation    10/14/1990 MRN [id number]   [facility] 7NE-A Attending No att. providers found   [facility] Day # 1 PCP No primary care provider on file.      Subjective: and without status migrainosus, not intractable     Recurrent seizures ([facility] [address]        Impression:  Partial onset epilepsy  Breakthrough seizures      Plan:   Alternating arm shaking from overnight last night was not a seizure, discussed with patient  In terms

## 2018-11-05 NOTE — DISCHARGE SUMMARY
[facility] HOSPITALIST  DISCHARGE SUMMARY     Monse Carreon Patient Status:  Observation    10/14/1990 MRN [id number]   [facility] 7NE-A Attending No att. providers found   [facility] Day # 1 PCP No primary care provider on file.      Date of descriptions):  • As above    Lab/Test results pending at Discharge:   · none    Consultants:  • Neurology    Discharge Medication List:     Discharge Medications      START taking these medications      Instructions Prescription details   levetiracetam 75 auscultation bilaterally. No wheezes. No rhonchi. Cardiovascular: S1, S2. Regular rate and rhythm. No murmurs, rubs or gallops. Abdomen: Soft, nontender, nondistended. Positive bowel sounds. No rebound or guarding.   Neurologic: No focal neurological de

## 2018-11-07 NOTE — PROCEDURES
ELECTROENCEPHALOGRAM REPORT    Patient Name: Jules Carreon  Chart ID: [id number]  Ordering Physician: Dr. Lisa Albrecht  Start Date: 11/1/18 17:54pm  End Date: 11/2/18 12:11pm  Patient Diagnosis:   25year old male with history of epilepsy, on Vimpat an patient’s documentation. The patient did not  have clinical episodes suspicious for seizures during the study. There was not documentation of any hallucinations, delusions or changes in level of consciousness.     XLTEK EVENTS:  There were Multiple Xltek

## 2018-11-20 ENCOUNTER — TELEPHONE (OUTPATIENT)
Dept: NEUROLOGY | Facility: CLINIC | Age: 28
End: 2018-11-20

## 2018-11-20 ENCOUNTER — OFFICE VISIT (OUTPATIENT)
Dept: NEUROLOGY | Facility: CLINIC | Age: 28
End: 2018-11-20
Payer: MEDICAID

## 2018-11-20 VITALS
HEART RATE: 65 BPM | BODY MASS INDEX: 26.32 KG/M2 | SYSTOLIC BLOOD PRESSURE: 123 MMHG | DIASTOLIC BLOOD PRESSURE: 72 MMHG | RESPIRATION RATE: 16 BRPM | HEIGHT: 71 IN | WEIGHT: 188 LBS

## 2018-11-20 DIAGNOSIS — G40.209 PARTIAL SYMPTOMATIC EPILEPSY WITH COMPLEX PARTIAL SEIZURES, NOT INTRACTABLE, WITHOUT STATUS EPILEPTICUS (HCC): Primary | ICD-10-CM

## 2018-11-20 DIAGNOSIS — G25.81 RESTLESS LEGS SYNDROME (RLS): ICD-10-CM

## 2018-11-20 DIAGNOSIS — R11.0 NAUSEA: ICD-10-CM

## 2018-11-20 PROCEDURE — 99214 OFFICE O/P EST MOD 30 MIN: CPT | Performed by: OTHER

## 2018-11-20 RX ORDER — ONDANSETRON 4 MG/1
4 TABLET, FILM COATED ORAL EVERY 6 HOURS PRN
Qty: 30 TABLET | Refills: 0 | Status: ON HOLD | OUTPATIENT
Start: 2018-11-20 | End: 2018-12-03

## 2018-11-20 RX ORDER — LEVETIRACETAM 1000 MG/1
1000 TABLET ORAL 2 TIMES DAILY
Status: ON HOLD | COMMUNITY
End: 2018-12-03

## 2018-11-20 NOTE — PROGRESS NOTES
[facility] Progress Note    Patient presents with:  Seizures: Follow up      HPI  As per my initial H&P from 9/9/14:  \"Rocco Palomino is a 20year old, who presents for evaluation of seizures.  Patient states he had his first seizure a year where he was wet his bed; admits to some issues with memory. \"         Patient last seen 10/25/18. Since last visit, he was admitted to [facility] ED for repeat seizures.   He was monitored on video EEG after being loaded with Keppra - Dilantin was stopp and Sexual Activity      Alcohol use:  Yes        Alcohol/week: 0.0 oz        Comment: social      Drug use: Yes        Comment: Marijuana once every other day      Sexual activity: Not on file    Other Topics      Concerns:         Service: Not Ask conversational    CN: PERRL, EOMI with no nystagmus, VFF, smile symmetric, sensation intact, tongue and palate midline, SCM/hearing intact, otherwise, CN 2-12 intact   Motor: 5/5 strength throughout, tone normal  Sensory: intact to light touch throughout >=60 78 80    GFR, -American      >=60 90 92    AST (SGOT)      15 - 41 U/L  32    ALT (SGPT)      17 - 63 U/L  52    ALKALINE PHOSPHATASE      45 - 117 U/L  91    Total Bilirubin      0.1 - 2.0 mg/dL  0.2    TOTAL PROTEIN      6.4 - 8.2 g/dL  7.7 CALCULATED OSMOLALITY      275 - 295 mOsm/kg 298 (H)   GFR, Non-      >=60 75   GFR, -American      >=60 87   AST (SGOT)      15 - 41 U/L 16   ALT (SGPT)      17 - 63 U/L 29   ALKALINE PHOSPHATASE      45 - 117 U/L 84   Total Bilir bedwetting and /or tongue biting. EEG previously done was negative for epileptiform activity or seizures.         Patient has had multiple different medications tried for seizures (Keppra, Depakote, phenobarbital) but still with break through seizures or e care as noted above    No Follow-up on file.     Marianna Hernandez MD, Neurology  [facility]  Pager [phone]  11/20/2018

## 2018-11-20 NOTE — PATIENT INSTRUCTIONS
Have records sent from [facility] when done with admission  Follow up in ~ 3 months   Refill policies:    • Allow 2-3 business days for refills; controlled substances may take longer.   • Contact your pharmacy at least 5 days prior to running out of medication an is done without insurance authorization, patient may be responsible for the entire amount billed. Precertification and Prior Authorizations: If your physician has recommended that you have a procedure or additional testing performed.   Refugio Blood

## 2018-12-02 ENCOUNTER — TELEPHONE (OUTPATIENT)
Dept: NEUROLOGY | Facility: CLINIC | Age: 28
End: 2018-12-02

## 2018-12-02 ENCOUNTER — HOSPITAL ENCOUNTER (OUTPATIENT)
Facility: HOSPITAL | Age: 28
Setting detail: OBSERVATION
Discharge: HOME | End: 2018-12-03
Attending: EMERGENCY MEDICINE | Admitting: HOSPITALIST
Payer: MEDICAID

## 2018-12-02 DIAGNOSIS — R51.9 NONINTRACTABLE EPISODIC HEADACHE, UNSPECIFIED HEADACHE TYPE: ICD-10-CM

## 2018-12-02 DIAGNOSIS — G40.909 RECURRENT SEIZURES (HCC): Primary | ICD-10-CM

## 2018-12-02 PROCEDURE — 99220 INITIAL OBSERVATION CARE,LEVL III: CPT | Performed by: HOSPITALIST

## 2018-12-02 RX ORDER — ONDANSETRON 2 MG/ML
4 INJECTION INTRAMUSCULAR; INTRAVENOUS EVERY 6 HOURS PRN
Status: DISCONTINUED | OUTPATIENT
Start: 2018-12-02 | End: 2018-12-03

## 2018-12-02 RX ORDER — GABAPENTIN 100 MG/1
200 CAPSULE ORAL NIGHTLY
Status: DISCONTINUED | OUTPATIENT
Start: 2018-12-02 | End: 2018-12-03

## 2018-12-02 RX ORDER — LEVETIRACETAM 500 MG/1
1000 TABLET ORAL 2 TIMES DAILY
Status: DISCONTINUED | OUTPATIENT
Start: 2018-12-02 | End: 2018-12-03

## 2018-12-02 RX ORDER — METOCLOPRAMIDE HYDROCHLORIDE 5 MG/ML
10 INJECTION INTRAMUSCULAR; INTRAVENOUS EVERY 8 HOURS PRN
Status: DISCONTINUED | OUTPATIENT
Start: 2018-12-02 | End: 2018-12-03

## 2018-12-02 RX ORDER — ACETAMINOPHEN 325 MG/1
650 TABLET ORAL EVERY 6 HOURS PRN
Status: DISCONTINUED | OUTPATIENT
Start: 2018-12-02 | End: 2018-12-03

## 2018-12-02 RX ORDER — LORAZEPAM 2 MG/ML
1 INJECTION INTRAMUSCULAR EVERY 4 HOURS PRN
Status: DISCONTINUED | OUTPATIENT
Start: 2018-12-02 | End: 2018-12-03

## 2018-12-02 RX ORDER — ENOXAPARIN SODIUM 100 MG/ML
40 INJECTION SUBCUTANEOUS NIGHTLY
Status: DISCONTINUED | OUTPATIENT
Start: 2018-12-02 | End: 2018-12-03

## 2018-12-02 NOTE — TELEPHONE ENCOUNTER
I was called by the [place]. She states that he had a seizure last night and was taken to [facility]. Per deep pt has been discharged back to home at this time. She wanted to make Dr. Haley Ashraf aware.

## 2018-12-03 VITALS
SYSTOLIC BLOOD PRESSURE: 137 MMHG | TEMPERATURE: 98 F | WEIGHT: 185 LBS | HEART RATE: 60 BPM | BODY MASS INDEX: 25.9 KG/M2 | OXYGEN SATURATION: 100 % | HEIGHT: 71 IN | RESPIRATION RATE: 18 BRPM | DIASTOLIC BLOOD PRESSURE: 71 MMHG

## 2018-12-03 PROCEDURE — 99223 1ST HOSP IP/OBS HIGH 75: CPT | Performed by: OTHER

## 2018-12-03 PROCEDURE — 99217 OBSERVATION CARE DISCHARGE: CPT | Performed by: HOSPITALIST

## 2018-12-03 RX ORDER — LEVETIRACETAM 500 MG/1
1500 TABLET ORAL 2 TIMES DAILY
Status: DISCONTINUED | OUTPATIENT
Start: 2018-12-03 | End: 2018-12-03

## 2018-12-03 RX ORDER — LEVETIRACETAM 1000 MG/1
1500 TABLET ORAL 2 TIMES DAILY
Qty: 90 TABLET | Refills: 5 | Status: SHIPPED | OUTPATIENT
Start: 2018-12-03 | End: 2018-12-03

## 2018-12-03 RX ORDER — LEVETIRACETAM 1000 MG/1
1500 TABLET ORAL 2 TIMES DAILY
Qty: 90 TABLET | Refills: 5 | Status: SHIPPED | OUTPATIENT
Start: 2018-12-03 | End: 2021-07-19

## 2018-12-03 RX ORDER — POTASSIUM CHLORIDE 20 MEQ/1
40 TABLET, EXTENDED RELEASE ORAL EVERY 4 HOURS
Status: COMPLETED | OUTPATIENT
Start: 2018-12-03 | End: 2018-12-03

## 2018-12-03 RX ORDER — IBUPROFEN 600 MG/1
600 TABLET ORAL 3 TIMES DAILY PRN
Status: DISCONTINUED | OUTPATIENT
Start: 2018-12-03 | End: 2018-12-03

## 2018-12-03 NOTE — PLAN OF CARE
NEUROLOGICAL - ADULT    • Achieves stable or improved neurological status Progressing    • Absence of seizures Progressing        PAIN - ADULT    • Verbalizes/displays adequate comfort level or patient's stated pain goal Progressing        SAFETY ADULT - F

## 2018-12-03 NOTE — PROGRESS NOTES
[facility] HOSPITALIST  Progress Note     Luz Maria Carreon Patient Status:  Inpatient    10/14/1990 MRN [id number]   [facility] 3NW-A Attending Dana Soto MD   Hosp Day # 0 PCP No primary care provider on file.      Chief Complaint: Ina Miramontes Nightly   • lacosamide  150 mg Oral BID       ASSESSMENT / PLAN:     1. Recurrent seizures  1. AED adjusted per neuro  2. Plan for admission to [facility] on 12/11  3. Keppra increased  4. Neurology following  2.  Migraines, related to above    Plan of care: AED

## 2018-12-03 NOTE — H&P
[facility] HOSPITALIST  History and Physical     Salinadoreen Carreon Patient Status:  Observation    10/14/1990 MRN [id number]   [facility] 3NW-A Attending Ben Dominguez MD   Hosp Day # 0 PCP No primary care provider on file.      Chief Com Oral Tab Take 150 mg by mouth 2 (two) times daily. Disp: 180 tablet Rfl: 0   gabapentin 100 MG Oral Cap Take 2 capsules (200 mg total) by mouth nightly.  Disp: 60 capsule Rfl: 2   Ondansetron HCl (ZOFRAN) 4 mg tablet Take 1 tablet (4 mg total) by mouth ever Epic.      ASSESSMENT / PLAN:     1. Recurrent tonic clonic seizures/epilepsy  2. HA  1. Consult neuro  2. Cont anti siezure meds  3. Seizure precautions  4.  Ativan PRN    Quality:  · DVT Prophylaxis: lovenox  · CODE status: full  · Marie: no    Plan of ca

## 2018-12-03 NOTE — ED PROVIDER NOTES
Patient Seen in: [facility] Emergency Department    History   Patient presents with:  Seizure Disorder (neurologic)    Stated Complaint: seizure at 0700 and 1800 today, on meds    HPI    78-year-old male with history of seizure disorder presents shannon GRAFT PROCEDURE             Social History    Tobacco Use      Smoking status: Never Smoker      Smokeless tobacco: Never Used    Alcohol use:  Yes      Alcohol/week: 0.0 oz      Comment: social    Drug use: Yes      Comment: Marijuana once every other day components within normal limits   CBC WITH DIFFERENTIAL WITH PLATELET    Narrative: The following orders were created for panel order CBC WITH DIFFERENTIAL WITH PLATELET.   Procedure                               Abnormality         Status

## 2018-12-03 NOTE — ED INITIAL ASSESSMENT (HPI)
Pt with epilepsy here for increased seizure activity today.  Adolfo reports that usually 1seizure a month with last seizure 11/1/18  Today, Pt woke up with tonic/clonic seizure this AM, seen at [facility] 1500mg Keppra given as well as h

## 2018-12-03 NOTE — ED INITIAL ASSESSMENT (HPI)
Per pt's kimberly, \"he had a full blown seizure at 7am today, full body jerking. He was seen at [facility]. He was given Keppra 1500 mg thru the IV.  When we got home at 2pm today, he took another 1000 mg Keppra per their instructions and 150

## 2018-12-03 NOTE — PAYOR COMM NOTE
--------------  ADMISSION REVIEW     Payor: Alden Ojeda #:  [id number]  Authorization Number: N/A    Admit date: N/A  Admit time: N/A       Admitting Physician: Randall Aschoff, MD  Attending Physician:  Mary Jo Flores Denies visual change, denies difficulty speaking or swallowing. Denies numbness tingling weakness the extremities. Denies any fevers or chills. Denies any rashes.   Patient reports that he has been taking all of his seizure medications as prescribed and muscle strength +5/5 bilateral upper and lower extremities cerebellar finger to nose intact, no pronator drift, sensation intact.     ED Course     Labs Reviewed   COMP METABOLIC PANEL (14) - Abnormal; Notable for the following components:       Result Valu w/ 1500 mg Keppra as well as haldol and ativan. Sx's resolved and was dc'ed. Was told to take his normal 1000 mg keppra this afternoon and vimpat. After dc, had another seizure at 6 PM while sitting. No trauma per fiance who was there again.  He became unre neurological deficits. CNII-XII grossly intact. Musculoskeletal: Moves all extremities. Extremities: No edema or cyanosis. Integument: No rashes or lesions. Psychiatric: Appropriate mood and affect.     Diagnostic Data:      Labs:  Recent Labs   Lab  1

## 2018-12-03 NOTE — PLAN OF CARE
NEUROLOGICAL - ADULT    • Achieves stable or improved neurological status Progressing    • Absence of seizures Progressing        Patient/Family Goals    • Patient/Family Long Term Goal Progressing    • Patient/Family Short Term Goal Progressing        SAF

## 2018-12-04 NOTE — DISCHARGE SUMMARY
[facility] HOSPITALIST  DISCHARGE SUMMARY     Yvette Carreon Patient Status:  Inpatient    10/14/1990 MRN [id number]   [facility] 3NW-A Attending No att. providers found   [facility] Day # 1 PCP No primary care provider on file.      Date of Ad · none    Consultants:  • Neurology    Discharge Medication List:     Discharge Medications      CHANGE how you take these medications      Instructions Prescription details   levetiracetam 1000 MG Tabs  Commonly known as:  KEPPRA  What changed:  how muc chart    Marisol Paz MD 12/4/2018    Time spent:  > 30 minutes

## 2018-12-05 NOTE — PAYOR COMM NOTE
--------------  ADMISSION REVIEW     Payor: Alden Ojeda #:  [id number]  Authorization Number: [id number]    Admit date: 12/3/18  Admit time: [address]       Admitting Physician: Shahnaz Torres MD  Primary Care Physician Denies drug or alcohol use.     Past Medical History:   Diagnosis Date   • Epilepsy ([facility] [address]    • Headache     Due to seizures   • Seizure disorder [facility])        Past Surgical History:   Procedure Laterality Date   • HERNIA SURGERY      Umbilical   • REPAIR ING Result Value    Creatinine 1.40 (*)     AST 46 (*)        MDM   Patient IV established, blood work obtained. Patient given IV fluid hydration.   Discussed with neurologist Dr. Ana Laura Romero who requested patient be admitted as the patient has had 2 seizur Allergies    Medications:    No current facility-administered medications on file prior to encounter. Current Outpatient Medications on File Prior to Encounter:  levetiracetam 1000 MG Oral Tab Take 1,000 mg by mouth 2 (two) times daily.  Disp:  Rfl:    [address] 44   BILT  0.5   TP  7.6       Estimated Creatinine Clearance: 82.2 mL/min (A) (based on SCr of 1.4 mg/dL (H)). No results for input(s): PTP, INR in the last 168 hours. No results for input(s): TROP, CK in the last 168 hours.     Imaging: Imaging data smacking\" movements. He then remained confused after this event and was confusing his fiancee with other family members (calling her grandma), so filucase drove him to [place].   Patient states on the way to [place], he slept the whole way which is not usual - will give loading dose IV Keppra 500 mg x1 here and then likely home after done to start increased maintenance dose Keppra 1500 mg bid tonight - discussed in detail with patient and plan for admission to [facility] as scheduled 12/11 for event c

## 2018-12-06 ENCOUNTER — TELEPHONE (OUTPATIENT)
Dept: NEUROLOGY | Facility: CLINIC | Age: 28
End: 2018-12-06

## 2018-12-06 NOTE — TELEPHONE ENCOUNTER
S: Wanting to clarify indications for gabapentin    B: Per LOV notes patient taking 200 mg nightly for RLS symptoms    A: [place] doesn't feel that he is having sleeplessness due to RLS. He states that OTC sleep aids and edibles are working well for him.

## 2018-12-06 NOTE — TELEPHONE ENCOUNTER
Spoke with patient regarding test results. Advised per doctors note below  Pt verbalized understanding and has no other questions or concerns.            Notes recorded by Maude Alexander MD on 12/5/2018 at 3:40 PM CST  Results noted, [place] level 33 - wit

## 2018-12-07 NOTE — TELEPHONE ENCOUNTER
Patient informed. He states he does not think he needs to taper off. Epic updated to reflect discontinuation.

## 2018-12-07 NOTE — TELEPHONE ENCOUNTER
Ok to d/c - discussed with staff; if concerned about withdrawal, can do 100 mg nightly x3 days, then stop taking but ok to stop either way as this is low dose

## 2019-02-21 ENCOUNTER — OFFICE VISIT (OUTPATIENT)
Dept: NEUROLOGY | Facility: CLINIC | Age: 29
End: 2019-02-21
Payer: MEDICAID

## 2019-02-21 VITALS
HEART RATE: 78 BPM | RESPIRATION RATE: 16 BRPM | OXYGEN SATURATION: 98 % | BODY MASS INDEX: 26.18 KG/M2 | DIASTOLIC BLOOD PRESSURE: 70 MMHG | HEIGHT: 71 IN | SYSTOLIC BLOOD PRESSURE: 120 MMHG | WEIGHT: 187 LBS

## 2019-02-21 DIAGNOSIS — G40.209 PARTIAL SYMPTOMATIC EPILEPSY WITH COMPLEX PARTIAL SEIZURES, NOT INTRACTABLE, WITHOUT STATUS EPILEPTICUS (HCC): Primary | ICD-10-CM

## 2019-02-21 PROCEDURE — 99214 OFFICE O/P EST MOD 30 MIN: CPT | Performed by: OTHER

## 2019-02-21 RX ORDER — LAMOTRIGINE 100 MG/1
100 TABLET ORAL 2 TIMES DAILY
COMMUNITY

## 2019-02-21 NOTE — PROGRESS NOTES
[employer] Progress Note    Patient presents with:  Seizures: follow up on seizures, had 3 seizures in the past 2 months, recent seizures was on 2/18/19      HPI  As per my initial H&P from 9/9/14:  Robert Dallas is a 20year old, w loss of time on current dose of Keppra. He admits to two episodes in the past year where he was wet his bed; admits to some issues with memory.       Patient states he has been seen by Dr. Claudell Deer since last visit - MRI brain unremarkable; had inpatient E MG Oral Tab, Take 100 mg by mouth daily. , Disp: , Rfl:   •  levetiracetam 1000 MG Oral Tab, Take 1.5 tablets (1,500 mg total) by mouth 2 (two) times daily.  (Patient taking differently: Take 1,500 mg by mouth daily.  ), Disp: 90 tablet, Rfl: 5  •  Lacosamid 31.0 - 37.0 g/dL  32.3    RDW      11.5 - 16.0 %  14.6    RDW-SD      35.1 - 46.3 fL  48.9 (H)    Prelim Neutrophil Abs      1.30 - 6.70 x10 (3) uL  2.53    Neutrophils Absolute      1.30 - 6.70 x10(3) uL  2.53    Lymphocytes Absolute      0.90 - 4.00 x10( Hematocrit      37.0 - 53.0 % 39.1   Platelet Count      928.3 - 450.0 10(3)uL 233.0   MCV      80.0 - 99.0 fL 88.3   MCH      27.0 - 33.2 pg 30.0   MCHC      31.0 - 37.0 g/dL 34.0   RDW      11.5 - 16.0 % 14.6   RDW-SD      35.1 - 46.3 fL 46.8 (H)   Pre seen. The EEG findings indicate interictal changes of focal epilepsy. Clinical correlation s recommended.        Prior as noted below:     EEG: (10/5/18    Interpretation    Background activity: Posterior dominant background rhythm   consisted of 9-10 Hz, 3 and weaning down Vimpat currently at 50 mg nightly. adjustment - will await workup - no changes at this time - continue Keppra 1000 mg bid and Vimpat 150 mg bid for now. For restless legs syndrome, he is now off gabapentin.       Given patient's r

## 2019-06-04 ENCOUNTER — TELEPHONE (OUTPATIENT)
Dept: NEUROLOGY | Facility: CLINIC | Age: 29
End: 2019-06-04

## 2020-10-13 NOTE — PROGRESS NOTES
[id number] [facility] Neurology Progress Note    Yvette Carreon Patient Status:  Inpatient    10/14/1990 MRN [id number]   [facility] 7NE-A Attending Angy Bethea MD   Hosp Day # 1 PCP No primary care provider on file.          Subjec discharge    Dr. Mariangel Vergara to follow.     DRE Snider  [facility]  11/2/2018, 8:07 AM  Spectre [id number]      Neurology Attending Addendum:  I have seen the patient independently, reviewed the history, labs and imaging, and agree with X Size Of Lesion In Cm: 0

## 2021-07-19 ENCOUNTER — OFFICE VISIT (OUTPATIENT)
Dept: INTERNAL MEDICINE CLINIC | Facility: CLINIC | Age: 31
End: 2021-07-19
Payer: MEDICAID

## 2021-07-19 VITALS
DIASTOLIC BLOOD PRESSURE: 72 MMHG | RESPIRATION RATE: 16 BRPM | HEART RATE: 78 BPM | BODY MASS INDEX: 24.92 KG/M2 | OXYGEN SATURATION: 100 % | WEIGHT: 178 LBS | TEMPERATURE: 98 F | SYSTOLIC BLOOD PRESSURE: 122 MMHG | HEIGHT: 71 IN

## 2021-07-19 DIAGNOSIS — F33.1 MDD (MAJOR DEPRESSIVE DISORDER), RECURRENT EPISODE, MODERATE (HCC): ICD-10-CM

## 2021-07-19 DIAGNOSIS — G89.29 CHRONIC LEFT SHOULDER PAIN: ICD-10-CM

## 2021-07-19 DIAGNOSIS — M25.512 CHRONIC LEFT SHOULDER PAIN: ICD-10-CM

## 2021-07-19 DIAGNOSIS — F41.1 GENERALIZED ANXIETY DISORDER: ICD-10-CM

## 2021-07-19 DIAGNOSIS — L90.5 SCAR TISSUE: Primary | ICD-10-CM

## 2021-07-19 DIAGNOSIS — Z79.899 MEDICAL MARIJUANA USE: ICD-10-CM

## 2021-07-19 DIAGNOSIS — R10.84 GENERALIZED ABDOMINAL PAIN: ICD-10-CM

## 2021-07-19 DIAGNOSIS — G40.804 OTHER INTRACTABLE EPILEPSY WITHOUT STATUS EPILEPTICUS (HCC): ICD-10-CM

## 2021-07-19 PROBLEM — G40.909 EPILEPSY (HCC): Status: ACTIVE | Noted: 2018-11-01

## 2021-07-19 PROBLEM — R51.9 NONINTRACTABLE EPISODIC HEADACHE, UNSPECIFIED HEADACHE TYPE: Status: RESOLVED | Noted: 2018-12-02 | Resolved: 2021-07-19

## 2021-07-19 PROCEDURE — 3078F DIAST BP <80 MM HG: CPT | Performed by: INTERNAL MEDICINE

## 2021-07-19 PROCEDURE — 99214 OFFICE O/P EST MOD 30 MIN: CPT | Performed by: INTERNAL MEDICINE

## 2021-07-19 PROCEDURE — 3074F SYST BP LT 130 MM HG: CPT | Performed by: INTERNAL MEDICINE

## 2021-07-19 PROCEDURE — 3008F BODY MASS INDEX DOCD: CPT | Performed by: INTERNAL MEDICINE

## 2021-07-19 RX ORDER — VENLAFAXINE HYDROCHLORIDE 37.5 MG/1
37.5 CAPSULE, EXTENDED RELEASE ORAL SEE ADMIN INSTRUCTIONS
Qty: 7 CAPSULE | Refills: 0 | Status: SHIPPED | OUTPATIENT
Start: 2021-07-19 | End: 2021-10-20

## 2021-07-19 RX ORDER — LORAZEPAM 0.5 MG/1
0.5 TABLET ORAL AS NEEDED
COMMUNITY
Start: 2018-12-16

## 2021-07-19 RX ORDER — VENLAFAXINE HYDROCHLORIDE 75 MG/1
75 CAPSULE, EXTENDED RELEASE ORAL DAILY
Qty: 30 CAPSULE | Refills: 1 | Status: SHIPPED | OUTPATIENT
Start: 2021-07-19 | End: 2021-10-20

## 2021-07-19 RX ORDER — OMEPRAZOLE 20 MG/1
20 CAPSULE, DELAYED RELEASE ORAL
Qty: 180 CAPSULE | Refills: 0 | Status: SHIPPED | OUTPATIENT
Start: 2021-07-19

## 2021-07-19 RX ORDER — LEVOCARNITINE 330 MG/1
330 TABLET ORAL 3 TIMES DAILY
COMMUNITY
End: 2021-10-20

## 2021-07-19 RX ORDER — DIVALPROEX SODIUM 250 MG/1
500 TABLET, EXTENDED RELEASE ORAL 2 TIMES DAILY
COMMUNITY
Start: 2021-04-30

## 2021-07-19 RX ORDER — CLOBAZAM 10 MG/1
20 TABLET ORAL EVERY EVENING
COMMUNITY

## 2021-07-19 NOTE — PROGRESS NOTES
Marycruz Owusu is a 26year old male. HPI:   Patient presents for the following issues. 1. L shoulder pain: off/on for 1 year. He is not sure if he had an injury but he has epilepsy. Also played sports as a young age.  More pain when he raises edema     Wt Readings from Last 6 Encounters:  07/19/21 : 178 lb (80.7 kg)  02/21/19 : 187 lb (84.8 kg)  12/02/18 : 185 lb (83.9 kg)  11/20/18 : 188 lb (85.3 kg)  11/01/18 : 188 lb (85.3 kg)  10/16/18 : 186 lb (84.4 kg)      No Known Allergies    Family Hi counseling did not help in past. Would like to try venlafaxine.  Will wait to start it until after his abdominal pain is a bit improved  # Medical Marijuana Use: states neurologist would like him to be on it but I recommended he use edibles instead of smoki

## 2021-07-22 ENCOUNTER — TELEPHONE (OUTPATIENT)
Dept: INTERNAL MEDICINE CLINIC | Facility: CLINIC | Age: 31
End: 2021-07-22

## 2021-07-22 NOTE — TELEPHONE ENCOUNTER
Records Requested from:    [facility] [address] [address]  FAX:[phone]      Confirmation was received. Copy of form made and placed in teal accordion file. Original form sent to scanning.

## 2021-08-02 NOTE — TELEPHONE ENCOUNTER
Medical Records received from [address] [place] [address], records placed at dr. Humberto Moses folder to review.

## 2021-10-20 ENCOUNTER — LAB ENCOUNTER (OUTPATIENT)
Dept: LAB | Age: 31
End: 2021-10-20
Attending: INTERNAL MEDICINE
Payer: MEDICAID

## 2021-10-20 ENCOUNTER — OFFICE VISIT (OUTPATIENT)
Dept: INTERNAL MEDICINE CLINIC | Facility: CLINIC | Age: 31
End: 2021-10-20
Payer: MEDICAID

## 2021-10-20 VITALS
OXYGEN SATURATION: 98 % | DIASTOLIC BLOOD PRESSURE: 70 MMHG | WEIGHT: 172.38 LBS | HEART RATE: 65 BPM | TEMPERATURE: 98 F | RESPIRATION RATE: 16 BRPM | SYSTOLIC BLOOD PRESSURE: 121 MMHG | HEIGHT: 69.5 IN | BODY MASS INDEX: 24.96 KG/M2

## 2021-10-20 DIAGNOSIS — R10.84 GENERALIZED ABDOMINAL PAIN: ICD-10-CM

## 2021-10-20 DIAGNOSIS — Z00.00 ROUTINE GENERAL MEDICAL EXAMINATION AT A HEALTH CARE FACILITY: Primary | ICD-10-CM

## 2021-10-20 DIAGNOSIS — F41.1 GENERALIZED ANXIETY DISORDER: ICD-10-CM

## 2021-10-20 PROCEDURE — 99395 PREV VISIT EST AGE 18-39: CPT | Performed by: INTERNAL MEDICINE

## 2021-10-20 PROCEDURE — 3074F SYST BP LT 130 MM HG: CPT | Performed by: INTERNAL MEDICINE

## 2021-10-20 PROCEDURE — 86803 HEPATITIS C AB TEST: CPT | Performed by: INTERNAL MEDICINE

## 2021-10-20 PROCEDURE — 36415 COLL VENOUS BLD VENIPUNCTURE: CPT | Performed by: INTERNAL MEDICINE

## 2021-10-20 PROCEDURE — 3008F BODY MASS INDEX DOCD: CPT | Performed by: INTERNAL MEDICINE

## 2021-10-20 PROCEDURE — 3078F DIAST BP <80 MM HG: CPT | Performed by: INTERNAL MEDICINE

## 2021-10-20 RX ORDER — ERGOCALCIFEROL (VITAMIN D2) 1250 MCG
1 CAPSULE ORAL WEEKLY
COMMUNITY
Start: 2019-06-27 | End: 2021-10-20

## 2021-10-20 RX ORDER — VENLAFAXINE HYDROCHLORIDE 150 MG/1
150 CAPSULE, EXTENDED RELEASE ORAL DAILY
Qty: 30 CAPSULE | Refills: 1 | Status: SHIPPED | OUTPATIENT
Start: 2021-10-20 | End: 2021-12-01

## 2021-10-20 NOTE — PATIENT INSTRUCTIONS
For your mood, please increase your venlafaxine to 150 mg every day. If your anxiety and mood swings are not improving in 4 weeks please send me a my-chart message so we can change your treatment.     For your stomach issues, please re-start omeprazole 20 m

## 2021-10-20 NOTE — PROGRESS NOTES
Shay Hodges is a 33year old male. HPI:   Patient presents for a physical exam.  1. Epilepsy - last seizure was this morning but he was not aware. 2. Stomach issues -he is not sure if omeprazole had helped.  His clobazam was increased on 9/28 Alcohol/week: 0.0 standard drinks    Drug use: Yes      Comment: Marijuana once every other day - smoking            EXAM:   /70 (BP Location: Right arm, Patient Position: Sitting)   Pulse 65   Temp 98.4 °F (36.9 °C) (Oral)   Resp 16   Ht 5' 9.5\" (1 Vaccines: advised on COVID, TDAP, and flu shot  Hep C screening: Ab ordered        The patient indicates understanding of these issues and agrees to the plan. The patient is asked to return in 3-4 months for chronic issues.    Deb Fitzpatrick MD

## 2021-12-01 RX ORDER — VENLAFAXINE HYDROCHLORIDE 150 MG/1
CAPSULE, EXTENDED RELEASE ORAL
Qty: 30 CAPSULE | Refills: 1 | OUTPATIENT
Start: 2021-12-01

## 2021-12-01 RX ORDER — VENLAFAXINE HYDROCHLORIDE 150 MG/1
150 CAPSULE, EXTENDED RELEASE ORAL DAILY
Qty: 30 CAPSULE | Refills: 1 | Status: SHIPPED | OUTPATIENT
Start: 2021-12-01 | End: 2023-11-06

## 2022-02-13 NOTE — CONSULTS
[facility]  Report of Consultation    Blade Carreon Patient Status:  Inpatient    10/14/1990 MRN [id number]   [facility] 3NW-A Attending Marlene Finn MD   Hosp Day # 0 PCP No primary care provider on file.      Reason for Cons having his typical nausea and headaches which often occur prior to his seizures - denied any focal numbness/tingling/weakness.    Currently, he feels back to normal with no headache currently; denies missing any doses but thinks some of the kids around him mEq, 40 mEq, Oral, Q4H  •  gabapentin (NEURONTIN) cap 200 mg, 200 mg, Oral, Nightly  •  Enoxaparin Sodium (LOVENOX) 40 MG/0.4ML injection 40 mg, 40 mg, Subcutaneous, Nightly  •  acetaminophen (TYLENOL) tab 650 mg, 650 mg, Oral, Q6H PRN  •  ondansetron HCl CREATSERUM 1.38 12/03/2018    BUN 15 12/03/2018     12/03/2018    K 3.6 12/03/2018     12/03/2018    CO2 25.0 12/03/2018    GLU 82 12/03/2018    CA 8.7 12/03/2018    ALB 4.2 12/02/2018    ALKPHO 80 12/02/2018    BILT 0.5 12/02/2018    TP 7.6 12 No

## 2022-09-12 ENCOUNTER — HOSPITAL ENCOUNTER (OUTPATIENT)
Age: 32
Discharge: ANOTHER HOSPITAL | End: 2022-09-12
Attending: EMERGENCY MEDICINE
Payer: MEDICAID

## 2022-09-12 VITALS
SYSTOLIC BLOOD PRESSURE: 123 MMHG | DIASTOLIC BLOOD PRESSURE: 68 MMHG | WEIGHT: 175 LBS | TEMPERATURE: 98 F | RESPIRATION RATE: 18 BRPM | HEART RATE: 84 BPM | BODY MASS INDEX: 23.7 KG/M2 | HEIGHT: 72 IN | OXYGEN SATURATION: 100 %

## 2022-09-12 DIAGNOSIS — R56.9 SEIZURES (HCC): Primary | ICD-10-CM

## 2022-09-12 LAB — GLUCOSE BLD-MCNC: 89 MG/DL (ref 70–99)

## 2022-09-12 PROCEDURE — 82962 GLUCOSE BLOOD TEST: CPT

## 2022-09-12 PROCEDURE — 99213 OFFICE O/P EST LOW 20 MIN: CPT

## 2022-09-12 NOTE — ED INITIAL ASSESSMENT (HPI)
Patient reports 2 seizures yesterday and a seizure today. Patient also complains of stomach pain as well.

## 2022-09-12 NOTE — ED QUICK NOTES
911 called for patient transport to [facility] emergency department per verbal order Dr. Darcie Leventhal    Paramedics here at 3:45pm.  Patient transported via ambulance to [facility].

## 2023-11-06 ENCOUNTER — OFFICE VISIT (OUTPATIENT)
Dept: INTERNAL MEDICINE CLINIC | Facility: CLINIC | Age: 33
End: 2023-11-06
Payer: MEDICAID

## 2023-11-06 VITALS
WEIGHT: 173.19 LBS | DIASTOLIC BLOOD PRESSURE: 76 MMHG | HEART RATE: 72 BPM | TEMPERATURE: 98 F | SYSTOLIC BLOOD PRESSURE: 126 MMHG | RESPIRATION RATE: 16 BRPM | BODY MASS INDEX: 23.46 KG/M2 | HEIGHT: 72 IN | OXYGEN SATURATION: 97 %

## 2023-11-06 DIAGNOSIS — F33.1 MDD (MAJOR DEPRESSIVE DISORDER), RECURRENT EPISODE, MODERATE (HCC): ICD-10-CM

## 2023-11-06 DIAGNOSIS — Z00.00 ROUTINE GENERAL MEDICAL EXAMINATION AT A HEALTH CARE FACILITY: Primary | ICD-10-CM

## 2023-11-06 DIAGNOSIS — F41.1 GENERALIZED ANXIETY DISORDER: ICD-10-CM

## 2023-11-06 DIAGNOSIS — R11.10 RECURRENT VOMITING: ICD-10-CM

## 2023-11-06 PROCEDURE — 3008F BODY MASS INDEX DOCD: CPT | Performed by: INTERNAL MEDICINE

## 2023-11-06 PROCEDURE — 99395 PREV VISIT EST AGE 18-39: CPT | Performed by: INTERNAL MEDICINE

## 2023-11-06 PROCEDURE — 3074F SYST BP LT 130 MM HG: CPT | Performed by: INTERNAL MEDICINE

## 2023-11-06 PROCEDURE — 3078F DIAST BP <80 MM HG: CPT | Performed by: INTERNAL MEDICINE

## 2023-11-06 RX ORDER — CENOBAMATE 150 MG/1
1 TABLET, FILM COATED ORAL NIGHTLY
COMMUNITY

## 2023-11-06 RX ORDER — CLOBAZAM 10 MG/1
20 TABLET ORAL NIGHTLY
COMMUNITY

## 2023-11-06 RX ORDER — LAMOTRIGINE 150 MG/1
150 TABLET ORAL 2 TIMES DAILY
COMMUNITY
Start: 2023-11-02

## 2023-11-06 NOTE — PATIENT INSTRUCTIONS
I recommend the following vaccines -  TDAP (tetanus, diptheriae, pertussis) vaccine every 10 years. Annual flu shot every September, October. Stay up to date with COVID vaccines.

## 2023-12-27 NOTE — TELEPHONE ENCOUNTER
Pt asked to call [facility] to get lab results faxed to [facility]. 27-Dec-2023 16:34

## 2024-03-24 NOTE — PLAN OF CARE
Patient awake, alert and oriented x4  Telemetry, Sinus Rhythm   Denies pain/discomfort  Denies abdominal pain; denies nausea   No acute focal neurological deficits, denies headache  No seizure activity; reported arm twitching resolved last night per patien PAST MEDICAL HISTORY:  Eczema

## 2025-08-01 ENCOUNTER — APPOINTMENT (OUTPATIENT)
Dept: CT IMAGING | Facility: HOSPITAL | Age: 35
End: 2025-08-01

## 2025-08-01 ENCOUNTER — APPOINTMENT (OUTPATIENT)
Dept: GENERAL RADIOLOGY | Facility: HOSPITAL | Age: 35
End: 2025-08-01

## 2025-08-01 ENCOUNTER — NURSE ONLY (OUTPATIENT)
Dept: ELECTROPHYSIOLOGY | Facility: HOSPITAL | Age: 35
End: 2025-08-01
Attending: Other

## 2025-08-01 ENCOUNTER — APPOINTMENT (OUTPATIENT)
Dept: GENERAL RADIOLOGY | Facility: HOSPITAL | Age: 35
End: 2025-08-01
Attending: EMERGENCY MEDICINE

## 2025-08-01 ENCOUNTER — HOSPITAL ENCOUNTER (INPATIENT)
Facility: HOSPITAL | Age: 35
LOS: 1 days | Discharge: HOME | End: 2025-08-02
Attending: EMERGENCY MEDICINE | Admitting: HOSPITALIST

## 2025-08-01 ENCOUNTER — APPOINTMENT (OUTPATIENT)
Dept: CT IMAGING | Facility: HOSPITAL | Age: 35
End: 2025-08-01
Attending: PHYSICIAN ASSISTANT

## 2025-08-01 DIAGNOSIS — F41.1 GENERALIZED ANXIETY DISORDER: ICD-10-CM
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
## 2025-08-01 DIAGNOSIS — G40.909 RECURRENT SEIZURES (HCC): ICD-10-CM

## 2025-08-01 LAB
ALBUMIN SERPL-MCNC: 4.8 G/DL (ref 3.2–4.8)
ALBUMIN/GLOB SERPL: 2 (ref 1–2)
ALP LIVER SERPL-CCNC: 43 U/L (ref 45–117)
ALT SERPL-CCNC: 40 U/L (ref 10–49)
ANION GAP SERPL CALC-SCNC: 5 MMOL/L (ref 0–18)
ANTIBODY SCREEN: NEGATIVE
AST SERPL-CCNC: 44 U/L (ref ?–34)
BASOPHILS # BLD AUTO: 0.03 X10(3) UL (ref 0–0.2)
BASOPHILS NFR BLD AUTO: 0.8 %
BILIRUB SERPL-MCNC: 0.4 MG/DL (ref 0.3–1.2)
BUN BLD-MCNC: 15 MG/DL (ref 9–23)
CALCIUM BLD-MCNC: 9.3 MG/DL (ref 8.7–10.6)
CHLORIDE SERPL-SCNC: 109 MMOL/L (ref 98–112)
CK SERPL-CCNC: 254 U/L (ref 46–171)
CO2 SERPL-SCNC: 29 MMOL/L (ref 21–32)
CREAT BLD-MCNC: 1.46 MG/DL (ref 0.7–1.3)
EGFRCR SERPLBLD CKD-EPI 2021: 64 ML/MIN/1.73M2 (ref 60–?)
EOSINOPHIL # BLD AUTO: 0.09 X10(3) UL (ref 0–0.7)
EOSINOPHIL NFR BLD AUTO: 2.3 %
ERYTHROCYTE [DISTWIDTH] IN BLOOD BY AUTOMATED COUNT: 14.6 %
GLOBULIN PLAS-MCNC: 2.4 G/DL (ref 2–3.5)
GLUCOSE BLD-MCNC: 96 MG/DL (ref 70–99)
HCT VFR BLD AUTO: 42.3 % (ref 39–53)
HGB BLD-MCNC: 14.6 G/DL (ref 13–17.5)
IMM GRANULOCYTES # BLD AUTO: 0.01 X10(3) UL (ref 0–1)
IMM GRANULOCYTES NFR BLD: 0.3 %
LYMPHOCYTES # BLD AUTO: 1.53 X10(3) UL (ref 1–4)
LYMPHOCYTES NFR BLD AUTO: 39.3 %
MCH RBC QN AUTO: 32.3 PG (ref 26–34)
MCHC RBC AUTO-ENTMCNC: 34.5 G/DL (ref 31–37)
MCV RBC AUTO: 93.6 FL (ref 80–100)
MONOCYTES # BLD AUTO: 0.46 X10(3) UL (ref 0.1–1)
MONOCYTES NFR BLD AUTO: 11.8 %
NEUTROPHILS # BLD AUTO: 1.77 X10 (3) UL (ref 1.5–7.7)
NEUTROPHILS # BLD AUTO: 1.77 X10(3) UL (ref 1.5–7.7)
NEUTROPHILS NFR BLD AUTO: 45.5 %
OSMOLALITY SERPL CALC.SUM OF ELEC: 297 MOSM/KG (ref 275–295)
PLATELET # BLD AUTO: 172 10(3)UL (ref 150–450)
POTASSIUM SERPL-SCNC: 4.5 MMOL/L (ref 3.5–5.1)
PROT SERPL-MCNC: 7.2 G/DL (ref 5.7–8.2)
RBC # BLD AUTO: 4.52 X10(6)UL (ref 4.3–5.7)
RH BLOOD TYPE: POSITIVE
SODIUM SERPL-SCNC: 143 MMOL/L (ref 136–145)
VALPROATE SERPL-MCNC: 45 UG/ML (ref 50–100)
WBC # BLD AUTO: 3.9 X10(3) UL (ref 4–11)

## 2025-08-01 PROCEDURE — 71101 X-RAY EXAM UNILAT RIBS/CHEST: CPT | Performed by: EMERGENCY MEDICINE

## 2025-08-01 PROCEDURE — 72128 CT CHEST SPINE W/O DYE: CPT | Performed by: PHYSICIAN ASSISTANT

## 2025-08-01 PROCEDURE — 72125 CT NECK SPINE W/O DYE: CPT

## 2025-08-01 PROCEDURE — 74177 CT ABD & PELVIS W/CONTRAST: CPT | Performed by: PHYSICIAN ASSISTANT

## 2025-08-01 PROCEDURE — 70450 CT HEAD/BRAIN W/O DYE: CPT

## 2025-08-01 PROCEDURE — 71045 X-RAY EXAM CHEST 1 VIEW: CPT

## 2025-08-01 PROCEDURE — 99223 1ST HOSP IP/OBS HIGH 75: CPT | Performed by: HOSPITALIST

## 2025-08-01 PROCEDURE — 72110 X-RAY EXAM L-2 SPINE 4/>VWS: CPT | Performed by: EMERGENCY MEDICINE

## 2025-08-01 RX ORDER — CENOBAMATE 150 MG/1
1 TABLET, FILM COATED ORAL NIGHTLY
COMMUNITY

## 2025-08-01 RX ORDER — MORPHINE SULFATE 4 MG/ML
4 INJECTION, SOLUTION INTRAMUSCULAR; INTRAVENOUS ONCE
Status: COMPLETED | OUTPATIENT
Start: 2025-08-01 | End: 2025-08-01

## 2025-08-01 RX ORDER — ONDANSETRON 2 MG/ML
4 INJECTION INTRAMUSCULAR; INTRAVENOUS EVERY 6 HOURS PRN
Status: DISCONTINUED | OUTPATIENT
Start: 2025-08-01 | End: 2025-08-02

## 2025-08-01 RX ORDER — DIVALPROEX SODIUM 500 MG/1
500 TABLET, FILM COATED, EXTENDED RELEASE ORAL 2 TIMES DAILY
Status: DISCONTINUED | OUTPATIENT
Start: 2025-08-01 | End: 2025-08-01

## 2025-08-01 RX ORDER — IBUPROFEN 200 MG
200 TABLET ORAL EVERY 4 HOURS PRN
Status: DISCONTINUED | OUTPATIENT
Start: 2025-08-01 | End: 2025-08-02

## 2025-08-01 RX ORDER — SENNOSIDES 8.6 MG
17.2 TABLET ORAL NIGHTLY PRN
Status: DISCONTINUED | OUTPATIENT
Start: 2025-08-01 | End: 2025-08-02

## 2025-08-01 RX ORDER — PROCHLORPERAZINE EDISYLATE 5 MG/ML
5 INJECTION INTRAMUSCULAR; INTRAVENOUS EVERY 8 HOURS PRN
Status: DISCONTINUED | OUTPATIENT
Start: 2025-08-01 | End: 2025-08-02

## 2025-08-01 RX ORDER — IBUPROFEN 400 MG/1
400 TABLET, FILM COATED ORAL EVERY 4 HOURS PRN
Status: DISCONTINUED | OUTPATIENT
Start: 2025-08-01 | End: 2025-08-02

## 2025-08-01 RX ORDER — ACETAMINOPHEN 500 MG
500 TABLET ORAL EVERY 4 HOURS PRN
Status: DISCONTINUED | OUTPATIENT
Start: 2025-08-01 | End: 2025-08-02

## 2025-08-01 RX ORDER — LORAZEPAM 2 MG/ML
INJECTION INTRAMUSCULAR
Status: COMPLETED
Start: 2025-08-01 | End: 2025-08-01

## 2025-08-01 RX ORDER — BISACODYL 10 MG
10 SUPPOSITORY, RECTAL RECTAL
Status: DISCONTINUED | OUTPATIENT
Start: 2025-08-01 | End: 2025-08-02

## 2025-08-01 RX ORDER — DIVALPROEX SODIUM 500 MG/1
500 TABLET, FILM COATED, EXTENDED RELEASE ORAL DAILY
Status: DISCONTINUED | OUTPATIENT
Start: 2025-08-02 | End: 2025-08-02

## 2025-08-01 RX ORDER — DIVALPROEX SODIUM 500 MG/1
500 TABLET, DELAYED RELEASE ORAL ONCE
Status: COMPLETED | OUTPATIENT
Start: 2025-08-01 | End: 2025-08-01

## 2025-08-01 RX ORDER — LAMOTRIGINE 100 MG/1
150 TABLET ORAL ONCE
Status: COMPLETED | OUTPATIENT
Start: 2025-08-01 | End: 2025-08-01

## 2025-08-01 RX ORDER — POLYETHYLENE GLYCOL 3350 17 G/17G
17 POWDER, FOR SOLUTION ORAL DAILY PRN
Status: DISCONTINUED | OUTPATIENT
Start: 2025-08-01 | End: 2025-08-02

## 2025-08-01 RX ORDER — DIVALPROEX SODIUM 250 MG/1
TABLET, FILM COATED, EXTENDED RELEASE ORAL SEE ADMIN INSTRUCTIONS
COMMUNITY
End: 2025-08-02

## 2025-08-01 RX ORDER — CLOBAZAM 10 MG/1
20 TABLET ORAL NIGHTLY
Status: DISCONTINUED | OUTPATIENT
Start: 2025-08-01 | End: 2025-08-02

## 2025-08-01 RX ORDER — LORAZEPAM 2 MG/ML
1 INJECTION INTRAMUSCULAR ONCE
Status: COMPLETED | OUTPATIENT
Start: 2025-08-01 | End: 2025-08-01

## 2025-08-01 RX ORDER — SODIUM PHOSPHATE, DIBASIC AND SODIUM PHOSPHATE, MONOBASIC 7; 19 G/230ML; G/230ML
1 ENEMA RECTAL ONCE AS NEEDED
Status: DISCONTINUED | OUTPATIENT
Start: 2025-08-01 | End: 2025-08-02

## 2025-08-01 RX ORDER — IBUPROFEN 600 MG/1
600 TABLET, FILM COATED ORAL EVERY 4 HOURS PRN
Status: DISCONTINUED | OUTPATIENT
Start: 2025-08-01 | End: 2025-08-02

## 2025-08-02 ENCOUNTER — APPOINTMENT (OUTPATIENT)
Dept: MRI IMAGING | Facility: HOSPITAL | Age: 35
End: 2025-08-02
Attending: STUDENT IN AN ORGANIZED HEALTH CARE EDUCATION/TRAINING PROGRAM

## 2025-08-02 VITALS
BODY MASS INDEX: 22 KG/M2 | TEMPERATURE: 98 F | SYSTOLIC BLOOD PRESSURE: 127 MMHG | HEART RATE: 66 BPM | OXYGEN SATURATION: 97 % | DIASTOLIC BLOOD PRESSURE: 79 MMHG | RESPIRATION RATE: 14 BRPM | WEIGHT: 162 LBS

## 2025-08-02 LAB
ANION GAP SERPL CALC-SCNC: 7 MMOL/L (ref 0–18)
BASOPHILS # BLD AUTO: 0.03 X10(3) UL (ref 0–0.2)
BASOPHILS NFR BLD AUTO: 0.5 %
BUN BLD-MCNC: 11 MG/DL (ref 9–23)
CALCIUM BLD-MCNC: 9.4 MG/DL (ref 8.7–10.6)
CHLORIDE SERPL-SCNC: 107 MMOL/L (ref 98–112)
CK SERPL-CCNC: 281 U/L (ref 46–171)
CO2 SERPL-SCNC: 29 MMOL/L (ref 21–32)
CREAT BLD-MCNC: 1.38 MG/DL (ref 0.7–1.3)
EGFRCR SERPLBLD CKD-EPI 2021: 68 ML/MIN/1.73M2 (ref 60–?)
EOSINOPHIL # BLD AUTO: 0.05 X10(3) UL (ref 0–0.7)
EOSINOPHIL NFR BLD AUTO: 0.8 %
ERYTHROCYTE [DISTWIDTH] IN BLOOD BY AUTOMATED COUNT: 14.3 %
GLUCOSE BLD-MCNC: 84 MG/DL (ref 70–99)
HCT VFR BLD AUTO: 42 % (ref 39–53)
HGB BLD-MCNC: 14.2 G/DL (ref 13–17.5)
IMM GRANULOCYTES # BLD AUTO: 0.01 X10(3) UL (ref 0–1)
IMM GRANULOCYTES NFR BLD: 0.2 %
LYMPHOCYTES # BLD AUTO: 1.63 X10(3) UL (ref 1–4)
LYMPHOCYTES NFR BLD AUTO: 25.5 %
MCH RBC QN AUTO: 31.8 PG (ref 26–34)
MCHC RBC AUTO-ENTMCNC: 33.8 G/DL (ref 31–37)
MCV RBC AUTO: 94 FL (ref 80–100)
MONOCYTES # BLD AUTO: 0.67 X10(3) UL (ref 0.1–1)
MONOCYTES NFR BLD AUTO: 10.5 %
NEUTROPHILS # BLD AUTO: 4.01 X10 (3) UL (ref 1.5–7.7)
NEUTROPHILS # BLD AUTO: 4.01 X10(3) UL (ref 1.5–7.7)
NEUTROPHILS NFR BLD AUTO: 62.5 %
OSMOLALITY SERPL CALC.SUM OF ELEC: 295 MOSM/KG (ref 275–295)
PLATELET # BLD AUTO: 162 10(3)UL (ref 150–450)
POTASSIUM SERPL-SCNC: 4.3 MMOL/L (ref 3.5–5.1)
RBC # BLD AUTO: 4.47 X10(6)UL (ref 4.3–5.7)
SODIUM SERPL-SCNC: 143 MMOL/L (ref 136–145)
WBC # BLD AUTO: 6.4 X10(3) UL (ref 4–11)

## 2025-08-02 PROCEDURE — 99239 HOSP IP/OBS DSCHRG MGMT >30: CPT | Performed by: STUDENT IN AN ORGANIZED HEALTH CARE EDUCATION/TRAINING PROGRAM

## 2025-08-02 PROCEDURE — 72146 MRI CHEST SPINE W/O DYE: CPT | Performed by: STUDENT IN AN ORGANIZED HEALTH CARE EDUCATION/TRAINING PROGRAM

## 2025-08-02 PROCEDURE — 72148 MRI LUMBAR SPINE W/O DYE: CPT | Performed by: STUDENT IN AN ORGANIZED HEALTH CARE EDUCATION/TRAINING PROGRAM

## 2025-08-02 PROCEDURE — 99223 1ST HOSP IP/OBS HIGH 75: CPT | Performed by: OTHER

## 2025-08-02 RX ORDER — CLOBAZAM 10 MG/1
20 TABLET ORAL NIGHTLY
Qty: 60 TABLET | Refills: 0 | Status: SHIPPED | OUTPATIENT
Start: 2025-08-02

## 2025-08-02 RX ORDER — DIVALPROEX SODIUM 500 MG/1
500 TABLET, DELAYED RELEASE ORAL DAILY
Qty: 30 TABLET | Refills: 0 | Status: SHIPPED | OUTPATIENT
Start: 2025-08-02 | End: 2025-08-02

## 2025-08-02 RX ORDER — DIVALPROEX SODIUM 250 MG/1
750 TABLET, FILM COATED, EXTENDED RELEASE ORAL NIGHTLY
Qty: 90 TABLET | Refills: 0 | Status: SHIPPED | OUTPATIENT
Start: 2025-08-02 | End: 2025-08-02

## 2025-08-02 RX ORDER — DIVALPROEX SODIUM 500 MG/1
500 TABLET, DELAYED RELEASE ORAL DAILY
Qty: 30 TABLET | Refills: 0 | Status: CANCELLED
Start: 2025-08-02

## 2025-08-02 RX ORDER — CLOBAZAM 10 MG/1
20 TABLET ORAL NIGHTLY
Qty: 60 TABLET | Refills: 0 | Status: CANCELLED
Start: 2025-08-02

## 2025-08-02 RX ORDER — DIVALPROEX SODIUM 250 MG/1
750 TABLET, FILM COATED, EXTENDED RELEASE ORAL NIGHTLY
Qty: 90 TABLET | Refills: 0 | Status: CANCELLED
Start: 2025-08-02

## 2025-08-02 RX ORDER — DIVALPROEX SODIUM 250 MG/1
750 TABLET, FILM COATED, EXTENDED RELEASE ORAL NIGHTLY
Qty: 90 TABLET | Refills: 0 | Status: SHIPPED | OUTPATIENT
Start: 2025-08-02

## 2025-08-02 RX ORDER — DIVALPROEX SODIUM 500 MG/1
500 TABLET, DELAYED RELEASE ORAL DAILY
Qty: 30 TABLET | Refills: 0 | Status: SHIPPED | OUTPATIENT
Start: 2025-08-02

## 2025-08-02 RX ORDER — CLOBAZAM 10 MG/1
20 TABLET ORAL NIGHTLY
Qty: 60 TABLET | Refills: 0 | Status: SHIPPED | OUTPATIENT
Start: 2025-08-02 | End: 2025-08-02

## 2025-08-05 ENCOUNTER — PATIENT OUTREACH (OUTPATIENT)
Dept: CASE MANAGEMENT | Age: 35
End: 2025-08-05

## 2025-08-05 LAB — LAMOTRIGINE LVL: 8.1 UG/ML

## (undated) NOTE — LETTER
[facility] CARE IN [place]  [address] [address]  Dept: [phone]  Dept Fax: [phone]         January 9, 2017    Patient: Antionette Cloud   YOB: 1990   Date of Visit: 1/9/2017       To Whom It May Concern:

## (undated) NOTE — LETTER
[facility] CARE IN [place]  [address] [address]  Dept: [phone]  Dept Fax: [phone]      January 12, 2017    Patient: Roxane Bradley   Date of Visit: 1/12/2017       To Whom It May Concern:    Ros Lerner was seen and

## (undated) NOTE — ED AVS SNAPSHOT
[facility] in [address]    [address] [place] [address]    Phone:  [phone]    Fax:  [address] [facility]   MRN: [id number]    Department:  THE [facility] OF [facility] Immediate Care in [address] [place] [address],[address]   Date of Visit:  1/12/2017 may not be covered by your plan. Please contact your insurance company to determine coverage for follow-up care and referrals. [facility]  [address] [facility], [address] [place] [address]  [phone] [address]  [address] prescription right away and begin taking the medication(s) as directed. If the Immediate Care Provider has read X-rays, these will be re-interpreted by a radiologist.  If there is a significant change in your reading, you will be contacted.  Please make Medicaid plans. To get signed up and covered, please call [phone] and ask to get set up for an insurance coverage that is in-network with [facility]. Armen     Sign up for [employer], your secure online medical record.   [employer] wi

## (undated) NOTE — ED AVS SNAPSHOT
[facility] in [address] [place] [address] Drive,4Th Floor    [address] [facility]    Phone:  [phone]    Fax:  [address] [facility]   MRN: [id number]    Department:  [facility] in [facility] END   Date of Visit:  2/24/2017 Please return to the Emergency department/clinic if symptoms worsen or you develop new symptoms. Follow up with your primary care physician in 2 days. Take any medications prescribed to you as instructed and complete any antibiotic prescription you begin. does not uncover every injury or illness.  If you have been referred to a primary care or a specialist physician for a follow-up visit, please tell this physician (or your personal doctor if your instructions are to return to your personal doctor) about any Additional Information       We are concerned for your overall well being:    - If you are a smoker or have smoked in the last 12 months, we encourage you to explore options for quitting.     - If you have concerns related to behavioral health issues or th

## (undated) NOTE — ED AVS SNAPSHOT
[facility] in [address] [place] [address] Drive,[address]Th Floor    [address] [place] [address]    Phone:  [phone]    Fax:  [address] [facility]   MRN: [id number]    Department:  [facility] Immediate Care in [facility] END   Date of Visit:  1/9/2017 hours with food as needed for pain. You may use Abreva, Blistex or Carmex to the chapped lips.   Follow-up with your doctor in 2-3 days for any new or worsening symptoms or sooner if you develop fever or purulent drainage from the cyst.    Discharge Refere to your personal doctor) about any new or lasting problems. The primary care or specialist physician will see patients referred from the [facility]. Follow-up care is at the discretion of that Physician.     IF THERE IS ANY CHANGE OR WORSENING O - If you have concerns related to behavioral health issues or thoughts of harming yourself, contact [facility] at [phone].     - If you don’t have insurance, Erin Gramajo has partnered with Patient Idanha Paula

## (undated) NOTE — LETTER
10/16/18    Dear Dr. Rosendo Morrison saw your patient, Brayan Garza for a follow up visit. Please see my progress note enclosed below. Let me know if you have any questions.     Thank you  Muriel Felix MD, Neurology  [address] [place] [address]  side of his tongue and was confused for about 20 minutes. At that time he was on Keppra 750 mg bid   He does have a history of seizures when he was 1years old, during which he was intubated.  He was started on phenobarbital and continued until age 9, after Food insecurity - worry: Not on file      Food insecurity - inability: Not on file      Transportation needs - medical: Not on file      Transportation needs - non-medical: Not on file    Occupational History      Not on file    Tobacco Use      [place] Height as of this encounter: 71\". Weight as of this encounter: 186 lb.     Gen: well developed, well nourished, no acute distress  HEENT: normocephalic  Heart; normal P1/Q8, regular rate and rhythm  Lungs: clear to auscultation bilaterally  Extremitie Carbon Dioxide, Total      22.0 - 32.0 mmol/L 28.0   ANION GAP      0 - 18 mmol/L 7   BUN      8 - 20 mg/dL 15   CREATININE      0.70 - 1.30 mg/dL 1.28   BUN/CREA RATIO      10.0 - 20.0 11.7   CALCIUM      8.3 - 10.3 mg/dL 9.1   CALCULATED OSMOLALITY Patient has had multiple different medications tried for seizures (Keppra, Depakote, phenobarbital) but still with break through seizures or events - description seems consistent with seizure but may have concurrent non-epileptic spells.    He is current [facility] [phone]  10/16/2018